# Patient Record
Sex: FEMALE | Race: BLACK OR AFRICAN AMERICAN | NOT HISPANIC OR LATINO | Employment: FULL TIME | URBAN - METROPOLITAN AREA
[De-identification: names, ages, dates, MRNs, and addresses within clinical notes are randomized per-mention and may not be internally consistent; named-entity substitution may affect disease eponyms.]

---

## 2020-06-08 ENCOUNTER — LAB VISIT (OUTPATIENT)
Dept: INTERNAL MEDICINE | Facility: CLINIC | Age: 25
End: 2020-06-08
Payer: COMMERCIAL

## 2020-06-08 DIAGNOSIS — R50.9 FEVER: ICD-10-CM

## 2020-06-08 DIAGNOSIS — R51.9 HEAD ACHE: ICD-10-CM

## 2020-06-08 PROCEDURE — U0003 INFECTIOUS AGENT DETECTION BY NUCLEIC ACID (DNA OR RNA); SEVERE ACUTE RESPIRATORY SYNDROME CORONAVIRUS 2 (SARS-COV-2) (CORONAVIRUS DISEASE [COVID-19]), AMPLIFIED PROBE TECHNIQUE, MAKING USE OF HIGH THROUGHPUT TECHNOLOGIES AS DESCRIBED BY CMS-2020-01-R: HCPCS

## 2020-06-09 ENCOUNTER — TELEPHONE (OUTPATIENT)
Dept: OBSTETRICS AND GYNECOLOGY | Facility: CLINIC | Age: 25
End: 2020-06-09

## 2020-06-09 LAB — SARS-COV-2 RNA RESP QL NAA+PROBE: DETECTED

## 2020-08-12 ENCOUNTER — LAB VISIT (OUTPATIENT)
Dept: LAB | Facility: HOSPITAL | Age: 25
End: 2020-08-12
Attending: NURSE PRACTITIONER
Payer: COMMERCIAL

## 2020-08-12 DIAGNOSIS — U07.1 COVID-19 VIRUS INFECTION: Primary | ICD-10-CM

## 2020-08-12 DIAGNOSIS — U07.1 COVID-19 VIRUS INFECTION: ICD-10-CM

## 2020-08-12 PROCEDURE — 36415 COLL VENOUS BLD VENIPUNCTURE: CPT

## 2020-08-12 PROCEDURE — 86769 SARS-COV-2 COVID-19 ANTIBODY: CPT

## 2020-08-13 ENCOUNTER — PATIENT MESSAGE (OUTPATIENT)
Dept: OBSTETRICS AND GYNECOLOGY | Facility: CLINIC | Age: 25
End: 2020-08-13

## 2020-08-13 LAB — SARS-COV-2 IGG SERPLBLD QL IA.RAPID: POSITIVE

## 2020-08-21 ENCOUNTER — LAB VISIT (OUTPATIENT)
Dept: LAB | Facility: HOSPITAL | Age: 25
End: 2020-08-21
Attending: NURSE PRACTITIONER
Payer: COMMERCIAL

## 2020-08-21 DIAGNOSIS — N92.6 IRREGULAR MENSES: Primary | ICD-10-CM

## 2020-08-21 DIAGNOSIS — N92.6 IRREGULAR MENSES: ICD-10-CM

## 2020-08-21 LAB — HCG INTACT+B SERPL-ACNC: <1.2 MIU/ML

## 2020-08-21 PROCEDURE — 84702 CHORIONIC GONADOTROPIN TEST: CPT

## 2020-08-21 PROCEDURE — 36415 COLL VENOUS BLD VENIPUNCTURE: CPT

## 2020-09-24 ENCOUNTER — PATIENT MESSAGE (OUTPATIENT)
Dept: DIABETES | Facility: CLINIC | Age: 25
End: 2020-09-24

## 2020-09-24 ENCOUNTER — PATIENT MESSAGE (OUTPATIENT)
Dept: ENDOCRINOLOGY | Facility: CLINIC | Age: 25
End: 2020-09-24

## 2020-09-25 ENCOUNTER — PATIENT MESSAGE (OUTPATIENT)
Dept: DIABETES | Facility: CLINIC | Age: 25
End: 2020-09-25

## 2020-09-29 ENCOUNTER — PATIENT MESSAGE (OUTPATIENT)
Dept: ENDOCRINOLOGY | Facility: CLINIC | Age: 25
End: 2020-09-29

## 2020-09-29 ENCOUNTER — OFFICE VISIT (OUTPATIENT)
Dept: OPHTHALMOLOGY | Facility: CLINIC | Age: 25
End: 2020-09-29
Payer: COMMERCIAL

## 2020-09-29 DIAGNOSIS — E10.9 TYPE 1 DIABETES MELLITUS WITHOUT COMPLICATION: Primary | ICD-10-CM

## 2020-09-29 DIAGNOSIS — H52.7 REFRACTIVE ERROR: ICD-10-CM

## 2020-09-29 PROCEDURE — 99999 PR PBB SHADOW E&M-EST. PATIENT-LVL III: CPT | Mod: PBBFAC,,, | Performed by: STUDENT IN AN ORGANIZED HEALTH CARE EDUCATION/TRAINING PROGRAM

## 2020-09-29 PROCEDURE — 92004 COMPRE OPH EXAM NEW PT 1/>: CPT | Mod: S$GLB,,, | Performed by: STUDENT IN AN ORGANIZED HEALTH CARE EDUCATION/TRAINING PROGRAM

## 2020-09-29 PROCEDURE — 99999 PR PBB SHADOW E&M-EST. PATIENT-LVL III: ICD-10-PCS | Mod: PBBFAC,,, | Performed by: STUDENT IN AN ORGANIZED HEALTH CARE EDUCATION/TRAINING PROGRAM

## 2020-09-29 PROCEDURE — 92015 PR REFRACTION: ICD-10-PCS | Mod: S$GLB,,, | Performed by: STUDENT IN AN ORGANIZED HEALTH CARE EDUCATION/TRAINING PROGRAM

## 2020-09-29 PROCEDURE — 92015 DETERMINE REFRACTIVE STATE: CPT | Mod: S$GLB,,, | Performed by: STUDENT IN AN ORGANIZED HEALTH CARE EDUCATION/TRAINING PROGRAM

## 2020-09-29 PROCEDURE — 92004 PR EYE EXAM, NEW PATIENT,COMPREHESV: ICD-10-PCS | Mod: S$GLB,,, | Performed by: STUDENT IN AN ORGANIZED HEALTH CARE EDUCATION/TRAINING PROGRAM

## 2020-09-29 RX ORDER — LANCETS 33 GAUGE
1 EACH MISCELLANEOUS
COMMUNITY
Start: 2018-04-10 | End: 2022-06-10 | Stop reason: ALTCHOICE

## 2020-09-29 RX ORDER — DESLORATADINE 5 MG/1
TABLET ORAL
COMMUNITY
End: 2023-09-15

## 2020-09-29 RX ORDER — FERROUS SULFATE 325(65) MG
TABLET ORAL
COMMUNITY
End: 2021-05-25

## 2020-09-29 RX ORDER — PEN NEEDLE, DIABETIC 30 GX3/16"
NEEDLE, DISPOSABLE MISCELLANEOUS
COMMUNITY
Start: 2018-05-15

## 2020-09-29 RX ORDER — ESCITALOPRAM OXALATE 10 MG/1
TABLET ORAL
COMMUNITY
End: 2021-05-25

## 2020-09-29 RX ORDER — INSULIN ASPART 100 [IU]/ML
INJECTION, SOLUTION INTRAVENOUS; SUBCUTANEOUS
COMMUNITY
Start: 2020-06-30 | End: 2020-11-25 | Stop reason: SDUPTHER

## 2020-09-29 RX ORDER — GLUCAGON 1 MG
1 VIAL (EA) INJECTION DAILY PRN
COMMUNITY
Start: 2017-08-01

## 2020-09-29 RX ORDER — URINE ACETONE TEST STRIPS
STRIP MISCELLANEOUS
COMMUNITY
Start: 2017-08-01

## 2020-09-29 RX ORDER — INSULIN GLARGINE 100 [IU]/ML
INJECTION, SOLUTION SUBCUTANEOUS
COMMUNITY
Start: 2020-06-25 | End: 2020-11-25 | Stop reason: SDUPTHER

## 2020-09-29 NOTE — PROGRESS NOTES
HPI     The patient is here for her annual eye exam. The patient states she has   had type 1 DB since age 13. The patients last A1C was 10.0.     NP- Referred by self [facility]    1. +DM type 1  + insulin  2. Refractive Error    Last edited by Mayela Sullivan on 9/29/2020  1:35 PM. (History)            Assessment /Plan     For exam results, see Encounter Report.    Type 1 diabetes mellitus without complication- Diabetes Mellitis without retinopathy  - Strict blood glucose control, discussed A1C control  - Annual DFE  - Follow with PCP      Refractive error- MR dispensed     Glaucoma Suspect- based on risk factors, enlarged C/D ratio, and AA race. Unknown FH. Low suspicion. Will obtain OCT RNFL    RTC in 1 year with dilation                   [General Appearance - Alert] : alert [General Appearance - In No Acute Distress] : in no acute distress [Sclera] : the sclera and conjunctiva were normal [Outer Ear] : the ears and nose were normal in appearance [Jugular Venous Distention Increased] : there was no jugular-venous distention [Auscultation Breath Sounds / Voice Sounds] : lungs were clear to auscultation bilaterally [Heart Sounds Pericardial Friction Rub] : no pericardial rub [Heart Sounds Gallop] : no gallops [Full Pulse] : the pedal pulses are present [Edema] : there was no peripheral edema [Abdomen Soft] : soft [Abdomen Tenderness] : non-tender [Cervical Lymph Nodes Enlarged Posterior Bilaterally] : posterior cervical [Cervical Lymph Nodes Enlarged Anterior Bilaterally] : anterior cervical [Supraclavicular Lymph Nodes Enlarged Bilaterally] : supraclavicular [Axillary Lymph Nodes Enlarged Bilaterally] : axillary [Femoral Lymph Nodes Enlarged Bilaterally] : femoral [Inguinal Lymph Nodes Enlarged Bilaterally] : inguinal [Involuntary Movements] : no involuntary movements were seen [___ (cm) Fistula] : [unfilled] (cm) fistula [Bruit] : a bruit was present [Thrill] : a thrill was present [] : no rash [No Focal Deficits] : no focal deficits [Impaired Insight] : insight and judgment were intact [Oriented To Time, Place, And Person] : oriented to person, place, and time [Affect] : the affect was normal [FreeTextEntry1] : left forearm AVF

## 2020-10-01 ENCOUNTER — OFFICE VISIT (OUTPATIENT)
Dept: ENDOCRINOLOGY | Facility: CLINIC | Age: 25
End: 2020-10-01
Payer: COMMERCIAL

## 2020-10-01 ENCOUNTER — LAB VISIT (OUTPATIENT)
Dept: LAB | Facility: HOSPITAL | Age: 25
End: 2020-10-01
Attending: INTERNAL MEDICINE
Payer: COMMERCIAL

## 2020-10-01 VITALS
BODY MASS INDEX: 42.56 KG/M2 | RESPIRATION RATE: 18 BRPM | WEIGHT: 249.31 LBS | HEIGHT: 64 IN | DIASTOLIC BLOOD PRESSURE: 90 MMHG | HEART RATE: 98 BPM | SYSTOLIC BLOOD PRESSURE: 129 MMHG

## 2020-10-01 DIAGNOSIS — E10.65 TYPE 1 DIABETES MELLITUS WITH HYPERGLYCEMIA: Primary | ICD-10-CM

## 2020-10-01 DIAGNOSIS — E10.65 TYPE 1 DIABETES MELLITUS WITH HYPERGLYCEMIA: ICD-10-CM

## 2020-10-01 DIAGNOSIS — E78.5 DYSLIPIDEMIA: ICD-10-CM

## 2020-10-01 DIAGNOSIS — R73.09 ELEVATED HEMOGLOBIN A1C: ICD-10-CM

## 2020-10-01 LAB
ALBUMIN SERPL BCP-MCNC: 3.5 G/DL (ref 3.5–5.2)
ALP SERPL-CCNC: 93 U/L (ref 55–135)
ALT SERPL W/O P-5'-P-CCNC: 12 U/L (ref 10–44)
ANION GAP SERPL CALC-SCNC: 8 MMOL/L (ref 8–16)
AST SERPL-CCNC: 18 U/L (ref 10–40)
BASOPHILS # BLD AUTO: 0.04 K/UL (ref 0–0.2)
BASOPHILS NFR BLD: 0.5 % (ref 0–1.9)
BILIRUB SERPL-MCNC: 0.5 MG/DL (ref 0.1–1)
BUN SERPL-MCNC: 5 MG/DL (ref 6–20)
CALCIUM SERPL-MCNC: 8.9 MG/DL (ref 8.7–10.5)
CHLORIDE SERPL-SCNC: 104 MMOL/L (ref 95–110)
CO2 SERPL-SCNC: 27 MMOL/L (ref 23–29)
CREAT SERPL-MCNC: 0.7 MG/DL (ref 0.5–1.4)
DIFFERENTIAL METHOD: ABNORMAL
EOSINOPHIL # BLD AUTO: 0.1 K/UL (ref 0–0.5)
EOSINOPHIL NFR BLD: 1.1 % (ref 0–8)
ERYTHROCYTE [DISTWIDTH] IN BLOOD BY AUTOMATED COUNT: 14.5 % (ref 11.5–14.5)
EST. GFR  (AFRICAN AMERICAN): >60 ML/MIN/1.73 M^2
EST. GFR  (NON AFRICAN AMERICAN): >60 ML/MIN/1.73 M^2
GLUCOSE SERPL-MCNC: 143 MG/DL (ref 70–110)
HCT VFR BLD AUTO: 43.8 % (ref 37–48.5)
HGB BLD-MCNC: 12.7 G/DL (ref 12–16)
IMM GRANULOCYTES # BLD AUTO: 0.02 K/UL (ref 0–0.04)
IMM GRANULOCYTES NFR BLD AUTO: 0.2 % (ref 0–0.5)
LYMPHOCYTES # BLD AUTO: 4 K/UL (ref 1–4.8)
LYMPHOCYTES NFR BLD: 49.6 % (ref 18–48)
MCH RBC QN AUTO: 22.3 PG (ref 27–31)
MCHC RBC AUTO-ENTMCNC: 29 G/DL (ref 32–36)
MCV RBC AUTO: 77 FL (ref 82–98)
MONOCYTES # BLD AUTO: 0.5 K/UL (ref 0.3–1)
MONOCYTES NFR BLD: 5.8 % (ref 4–15)
NEUTROPHILS # BLD AUTO: 3.4 K/UL (ref 1.8–7.7)
NEUTROPHILS NFR BLD: 42.8 % (ref 38–73)
NRBC BLD-RTO: 0 /100 WBC
PLATELET # BLD AUTO: 250 K/UL (ref 150–350)
PMV BLD AUTO: 11.3 FL (ref 9.2–12.9)
POTASSIUM SERPL-SCNC: 4.1 MMOL/L (ref 3.5–5.1)
PROT SERPL-MCNC: 7.7 G/DL (ref 6–8.4)
RBC # BLD AUTO: 5.7 M/UL (ref 4–5.4)
SODIUM SERPL-SCNC: 139 MMOL/L (ref 136–145)
TSH SERPL DL<=0.005 MIU/L-ACNC: 1.73 UIU/ML (ref 0.4–4)
WBC # BLD AUTO: 8.04 K/UL (ref 3.9–12.7)

## 2020-10-01 PROCEDURE — 95250 PR GLUCOSE MONITORING,72 HRS,SUB-Q SENSOR: ICD-10-PCS | Mod: S$GLB,,, | Performed by: INTERNAL MEDICINE

## 2020-10-01 PROCEDURE — 80053 COMPREHEN METABOLIC PANEL: CPT

## 2020-10-01 PROCEDURE — 99204 OFFICE O/P NEW MOD 45 MIN: CPT | Mod: S$GLB,,, | Performed by: INTERNAL MEDICINE

## 2020-10-01 PROCEDURE — 95250 CONT GLUC MNTR PHYS/QHP EQP: CPT | Mod: S$GLB,,, | Performed by: INTERNAL MEDICINE

## 2020-10-01 PROCEDURE — 99999 PR PBB SHADOW E&M-EST. PATIENT-LVL III: CPT | Mod: PBBFAC,,, | Performed by: INTERNAL MEDICINE

## 2020-10-01 PROCEDURE — 36415 COLL VENOUS BLD VENIPUNCTURE: CPT

## 2020-10-01 PROCEDURE — 84443 ASSAY THYROID STIM HORMONE: CPT

## 2020-10-01 PROCEDURE — 99999 PR PBB SHADOW E&M-EST. PATIENT-LVL III: ICD-10-PCS | Mod: PBBFAC,,, | Performed by: INTERNAL MEDICINE

## 2020-10-01 PROCEDURE — 83036 HEMOGLOBIN GLYCOSYLATED A1C: CPT

## 2020-10-01 PROCEDURE — 85025 COMPLETE CBC W/AUTO DIFF WBC: CPT

## 2020-10-01 PROCEDURE — 99204 PR OFFICE/OUTPT VISIT, NEW, LEVL IV, 45-59 MIN: ICD-10-PCS | Mod: S$GLB,,, | Performed by: INTERNAL MEDICINE

## 2020-10-01 NOTE — PROGRESS NOTES
Patient is here in clinic today for placement for continuous glucose monitoring sensor (cgms). Site is the back of the L upper arm. Site was cleaned and sensor was placed and started. Patient didn't ask any further questions regarding sensor placement. Patient was instructed to continue all daily activities such as shower and also check blood glucose levels as instructed. Patient was also informed to avoid any imaging procedures and acetaminophen during her procedure. Patient voiced understanding of all instructions given. Patient is scheduled to come back in one week for removal of sensor

## 2020-10-01 NOTE — PROGRESS NOTES
PCP: ARCENIO Hughes     Patient ID: Chey Chavira is a 24 y.o. female.    Chief Complaint:   Establish Care   DM      HPI    Patient is here for management of her diabetes.  She was managed by Dr. Horta.  She was diagnosed with diabetes more than 10 years ago.  She has type 1 diabetes.  On Lantus 36 units every morning  On NovoLog sliding scale as follows  1 unit for each 5 g of carbs plus the following equation  Insulin units equal blood sugar -120 divided by 20  For correction  History of using an insulin pump 5-6 years ago  Patient has Medtronic insulin pump with a sensor at home currently  CBGs 100-300  POCT GLUCOSE 206  Hypoglycemia occurs sometimes at 2-3 a.m.  Eye exam was done this year  No pain or numbness in feet  Diabetes ketoacidosis occurred in March 2019 and in 2018  Not on blood pressure medication  Not on cholesterol medication    Patient is .  She has 1 daughter      NO FAMILY HISTORY OF TYPE 1 DIABETES    Pt is an [employer]        Past Medical History:   Diagnosis Date    Anxiety     Diabetes mellitus     Diabetes mellitus type I     Hypercholesteremia        Past Surgical History:   Procedure Laterality Date    TONSILLECTOMY         Social History     Socioeconomic History    Marital status:      Spouse name: Not on file    Number of children: Not on file    Years of education: Not on file    Highest education level: Not on file   Occupational History    Not on file   Social Needs    Financial resource strain: Not on file    Food insecurity     Worry: Not on file     Inability: Not on file    Transportation needs     Medical: Not on file     Non-medical: Not on file   Tobacco Use    Smoking status: Never Smoker    Smokeless tobacco: Never Used   Substance and Sexual Activity    Alcohol use: Never     Frequency: Never    Drug use: Never    Sexual activity: Yes     Partners: Male     Birth control/protection: None   Lifestyle    Physical activity     Days  per week: Not on file     Minutes per session: Not on file    Stress: Not on file   Relationships    Social connections     Talks on phone: Not on file     Gets together: Not on file     Attends Tenriism service: Not on file     Active member of club or organization: Not on file     Attends meetings of clubs or organizations: Not on file     Relationship status: Not on file   Other Topics Concern    Not on file   Social History Narrative    Not on file       ROS:   Included in HPI  + Diabetes   ROS otherwise neg except for what is mentioned in the PMH, PSH and HPI    PE:  Vitals:    10/01/20 1007   BP: (!) 129/90   Pulse: 98   Resp: 18     Alert and oriented  No acute distress  + Obesity  Body mass index is 42.8 kg/m².  + Acanthosis  No acne  No Proptosis or conjunctivitis  No rash on tongue, + teeth  Nose nl  No goitre by inspection  Thyroid gland is palpable  No cervical lymphadenopathy  Heart reg, no gallop  Lungs cta, no wheezing  Abd soft, no tnd  No edema in lower legs  No rash  No bruises  Speech normal  Behavior normal  No tremor      Wt Readings from Last 1 Encounters:   10/01/20 1007 113.1 kg (249 lb 5.4 oz)         Lab Reviewed from 2019    A/P  Type 1 diabetes mellitus with hyperglycemia  Diabetes diagnosed more than 10 years ago  On Lantus 36 units every morning and on  Humalog as follows  1 unit for each 5 g of carbs plus 1 unit for each 20 mg above 120  History of treatment with Animus insulin pump about 6 years ago  Patient has her Medtronic insulin pump and the sensor at home  A1c was above 11 in 2019  History of diabetes ketoacidosis in March 2019 and DKA also in 2018  To avoid statin to avoid ACE-inhibitor because of the plan for pregnancy  POCT glucose 206, 4 hr postprandial  The plan is to reduce the long acting insulin and to increase the dose of Humalog   1 unit with each meal  -     Comprehensive metabolic panel; Future; Expected date: 10/01/2020  -     Hemoglobin A1C; Future; Expected  date: 10/01/2020  -     TSH; Future; Expected date: 10/01/2020  -     CBC auto differential; Future; Expected date: 10/01/2020    Elevated hemoglobin A1c    Dyslipidemia       Patient Instructions   Lantus 33 units    Humalog  Same equation you do  Plus one unit.     A glucose sensor will be applied today for 7 days for further evaluation of blood sugar control.  Follow-up will be needed next week.    Patient understands and agrees on the plan.

## 2020-10-02 LAB
ESTIMATED AVG GLUCOSE: 275 MG/DL (ref 68–131)
HBA1C MFR BLD HPLC: 11.2 % (ref 4–5.6)

## 2020-10-08 ENCOUNTER — CLINICAL SUPPORT (OUTPATIENT)
Dept: ENDOCRINOLOGY | Facility: CLINIC | Age: 25
End: 2020-10-08
Payer: COMMERCIAL

## 2020-10-08 ENCOUNTER — OFFICE VISIT (OUTPATIENT)
Dept: INTERNAL MEDICINE | Facility: CLINIC | Age: 25
End: 2020-10-08
Payer: COMMERCIAL

## 2020-10-08 ENCOUNTER — TELEPHONE (OUTPATIENT)
Dept: ADMINISTRATIVE | Facility: HOSPITAL | Age: 25
End: 2020-10-08

## 2020-10-08 VITALS
WEIGHT: 244.69 LBS | HEIGHT: 66 IN | SYSTOLIC BLOOD PRESSURE: 116 MMHG | BODY MASS INDEX: 39.32 KG/M2 | TEMPERATURE: 98 F | HEART RATE: 80 BPM | DIASTOLIC BLOOD PRESSURE: 68 MMHG

## 2020-10-08 DIAGNOSIS — R53.83 FATIGUE, UNSPECIFIED TYPE: ICD-10-CM

## 2020-10-08 DIAGNOSIS — E10.65 TYPE 1 DIABETES MELLITUS WITH HYPERGLYCEMIA: Primary | ICD-10-CM

## 2020-10-08 DIAGNOSIS — Z00.00 ROUTINE GENERAL MEDICAL EXAMINATION AT A HEALTH CARE FACILITY: ICD-10-CM

## 2020-10-08 PROCEDURE — 90471 FLU VACCINE (QUAD) GREATER THAN OR EQUAL TO 3YO PRESERVATIVE FREE IM: ICD-10-PCS | Mod: S$GLB,,, | Performed by: PHYSICIAN ASSISTANT

## 2020-10-08 PROCEDURE — 90686 FLU VACCINE (QUAD) GREATER THAN OR EQUAL TO 3YO PRESERVATIVE FREE IM: ICD-10-PCS | Mod: S$GLB,,, | Performed by: PHYSICIAN ASSISTANT

## 2020-10-08 PROCEDURE — 90471 IMMUNIZATION ADMIN: CPT | Mod: S$GLB,,, | Performed by: PHYSICIAN ASSISTANT

## 2020-10-08 PROCEDURE — 99385 PR PREVENTIVE VISIT,NEW,18-39: ICD-10-PCS | Mod: 25,S$GLB,, | Performed by: PHYSICIAN ASSISTANT

## 2020-10-08 PROCEDURE — 90686 IIV4 VACC NO PRSV 0.5 ML IM: CPT | Mod: S$GLB,,, | Performed by: PHYSICIAN ASSISTANT

## 2020-10-08 PROCEDURE — 82043 UR ALBUMIN QUANTITATIVE: CPT

## 2020-10-08 PROCEDURE — 99385 PREV VISIT NEW AGE 18-39: CPT | Mod: 25,S$GLB,, | Performed by: PHYSICIAN ASSISTANT

## 2020-10-08 PROCEDURE — 99999 PR PBB SHADOW E&M-EST. PATIENT-LVL IV: ICD-10-PCS | Mod: PBBFAC,,, | Performed by: PHYSICIAN ASSISTANT

## 2020-10-08 PROCEDURE — 99999 PR PBB SHADOW E&M-EST. PATIENT-LVL IV: CPT | Mod: PBBFAC,,, | Performed by: PHYSICIAN ASSISTANT

## 2020-10-08 RX ORDER — IRON,CARBONYL/ASCORBIC ACID 100-250 MG
1 TABLET ORAL DAILY
Qty: 90 TABLET | Refills: 0 | Status: SHIPPED | OUTPATIENT
Start: 2020-10-08 | End: 2021-01-06

## 2020-10-08 NOTE — PROGRESS NOTES
CGMS removed from pt after data downloaded. Site cleansed and checked for irritation. No irritation noted to left upper arm. Pt without complaints   Loaded into media

## 2020-10-08 NOTE — PROGRESS NOTES
Subjective:       Patient ID: Chey Chavira is a 24 y.o. female.    Chief Complaint: Establish Care    HPI  Patient comes in today as a new patient to clinic   She was previously with [facility] providers   She is an [employer] employee     She is a type 1 DM, uncontrolled. recently seen by Dr. Matthews, needing to get with a diabetic team member, states has a pump but currently not using it  She states she has making dietary change with her and they cooking and choosing healthier diet     She has some issue with some significant sleepiness in the afternoons as well as waking up with sweats.  Since her sugars pretty controlled not sure if that has something to do with it.    She states has history of YOSEF     Health Maintenance Due   Topic Date Due    Hepatitis C Screening  1997    Lipid Panel  1997    Foot Exam  12/18/2007    Urine Microalbumin  12/18/2007    HIV Screening  12/18/2012    TETANUS VACCINE  12/18/2015    Influenza Vaccine (1) 08/01/2020       Past Medical History:   Diagnosis Date    Anxiety     Diabetes mellitus     Diabetes mellitus type I     Hypercholesteremia        Current Outpatient Medications   Medication Sig Dispense Refill    acetone, urine, test (KETOSTIX) Strp Check urine ketones when BG > 250      blood sugar diagnostic (ONETOUCH ULTRA BLUE TEST STRIP MISC) Use to check blood sugars 10 times daily      desloratadine (CLARINEX) 5 mg tablet Clarinex 5 mg tablet   Take 1 tablet every day by oral route for 30 days.      escitalopram oxalate (LEXAPRO) 10 MG tablet Lexapro 10 mg tablet   Take 1 tablet every day by oral route in the morning.      ferrous sulfate (FEOSOL) 325 mg (65 mg iron) Tab tablet ferrous sulfate 325 mg (65 mg iron) tablet   Take 1 tablet every day by oral route.      glucagon, human recombinant, (GLUCAGON EMERGENCY KIT, HUMAN,) 1 mg SolR Inject 1 mg into the muscle daily as needed.      insulin (LANTUS SOLOSTAR U-100 INSULIN) glargine 100 units/mL (3mL) SubQ  "pen INJECT 36 UNITS UNDER THE SKIN EVERY MORNING      insulin aspart U-100 (NOVOLOG) 100 unit/mL (3 mL) InPn pen I:C ratio 1:5 with correction factor of 20 and target of 120; max of 70 units per day      lancets (ONETOUCH DELICA LANCETS) 33 gauge Misc Inject 1 each into the skin.      pen needle, diabetic 31 gauge x 3/16" Ndle Use as directed to inject insulin      iron-vitamin C 100-250 mg, ICAR-C, (ICAR-C) 100-250 mg Tab Take 1 tablet by mouth Daily. 90 tablet 0     No current facility-administered medications for this visit.        Review of Systems   Constitutional: Positive for activity change, diaphoresis (at night ) and fatigue. Negative for fever and unexpected weight change.   HENT: Negative for sore throat and trouble swallowing.    Respiratory: Negative for cough and shortness of breath.    Cardiovascular: Negative for chest pain.   Gastrointestinal: Negative for abdominal pain.   Endocrine: Positive for heat intolerance.   Hematological: Negative for adenopathy. Does not bruise/bleed easily.   All other systems reviewed and are negative.      Objective:   /68 (BP Location: Right arm, Patient Position: Sitting, BP Method: X-Large (Manual))   Pulse 80   Temp 98 °F (36.7 °C) (Tympanic)   Ht 5' 5.5" (1.664 m)   Wt 111 kg (244 lb 11.4 oz)   LMP 09/25/2020   BMI 40.10 kg/m²      Physical Exam  Constitutional:       Appearance: Normal appearance. She is obese.   HENT:      Head: Normocephalic and atraumatic.      Right Ear: Tympanic membrane and ear canal normal.      Left Ear: Tympanic membrane and ear canal normal.      Nose: Nose normal.      Mouth/Throat:      Mouth: Mucous membranes are moist.   Eyes:      Pupils: Pupils are equal, round, and reactive to light.   Neck:      Musculoskeletal: Normal range of motion and neck supple.   Cardiovascular:      Rate and Rhythm: Normal rate and regular rhythm.      Pulses: Normal pulses.      Heart sounds: Normal heart sounds.   Pulmonary:      " Breath sounds: Normal breath sounds.   Skin:     General: Skin is warm.      Capillary Refill: Capillary refill takes less than 2 seconds.   Neurological:      General: No focal deficit present.      Mental Status: She is alert.   Psychiatric:         Mood and Affect: Mood normal.         Thought Content: Thought content normal.         Judgment: Judgment normal.           Lab Results   Component Value Date    WBC 8.04 10/01/2020    HGB 12.7 10/01/2020    HCT 43.8 10/01/2020     10/01/2020    ALT 12 10/01/2020    AST 18 10/01/2020     10/01/2020    K 4.1 10/01/2020     10/01/2020    CREATININE 0.7 10/01/2020    BUN 5 (L) 10/01/2020    CO2 27 10/01/2020    TSH 1.733 10/01/2020    HGBA1C 11.2 (H) 10/01/2020       Assessment:       1. Type 1 diabetes mellitus with hyperglycemia    2. Fatigue, unspecified type    3. Routine general medical examination at a health care facility        Plan:   Type 1 diabetes mellitus with hyperglycemia  -     Iron and TIBC; Future; Expected date: 10/08/2020  -     Vitamin D; Future; Expected date: 10/08/2020  -     Lipid Panel; Future; Expected date: 10/08/2020  -     MICROALBUMIN / CREATININE RATIO URINE  -     Ambulatory referral/consult to Diabetes Education; Future; Expected date: 10/15/2020    Fatigue, unspecified type  -     Iron and TIBC; Future; Expected date: 10/08/2020  -     Vitamin D; Future; Expected date: 10/08/2020  -     Lipid Panel; Future; Expected date: 10/08/2020    Routine general medical examination at a health care facility  -     HIV 1/2 Ag/Ab (4th Gen); Future; Expected date: 10/08/2020  -     Hepatitis C Antibody; Future; Expected date: 10/08/2020    Other orders  -     iron-vitamin C 100-250 mg, ICAR-C, (ICAR-C) 100-250 mg Tab; Take 1 tablet by mouth Daily.  Dispense: 90 tablet; Refill: 0  -     Influenza - Quadrivalent (PF)      Start iron + C   Thinking sugars have a lot to do with her waking up sweating and fatgue    Will set her up with  DM team in hopes we get her back on a pump       Update labs

## 2020-10-08 NOTE — TELEPHONE ENCOUNTER
.Patient refused to schedule  Labs  during checkout.   Pt will to labs later she will call when she is ready

## 2020-10-09 LAB
ALBUMIN/CREAT UR: 385.5 UG/MG (ref 0–30)
CREAT UR-MCNC: 76 MG/DL (ref 15–325)
MICROALBUMIN UR DL<=1MG/L-MCNC: 293 UG/ML

## 2020-10-12 ENCOUNTER — LAB VISIT (OUTPATIENT)
Dept: LAB | Facility: HOSPITAL | Age: 25
End: 2020-10-12
Attending: PHYSICIAN ASSISTANT
Payer: COMMERCIAL

## 2020-10-12 DIAGNOSIS — E10.65 TYPE 1 DIABETES MELLITUS WITH HYPERGLYCEMIA: ICD-10-CM

## 2020-10-12 DIAGNOSIS — Z00.00 ROUTINE GENERAL MEDICAL EXAMINATION AT A HEALTH CARE FACILITY: ICD-10-CM

## 2020-10-12 DIAGNOSIS — R53.83 FATIGUE, UNSPECIFIED TYPE: ICD-10-CM

## 2020-10-12 LAB
25(OH)D3+25(OH)D2 SERPL-MCNC: 16 NG/ML (ref 30–96)
CHOLEST SERPL-MCNC: 210 MG/DL (ref 120–199)
CHOLEST/HDLC SERPL: 3.8 {RATIO} (ref 2–5)
HDLC SERPL-MCNC: 55 MG/DL (ref 40–75)
HDLC SERPL: 26.2 % (ref 20–50)
IRON SERPL-MCNC: 74 UG/DL (ref 30–160)
LDLC SERPL CALC-MCNC: 134.4 MG/DL (ref 63–159)
NONHDLC SERPL-MCNC: 155 MG/DL
SATURATED IRON: 20 % (ref 20–50)
TOTAL IRON BINDING CAPACITY: 369 UG/DL (ref 250–450)
TRANSFERRIN SERPL-MCNC: 249 MG/DL (ref 200–375)
TRIGL SERPL-MCNC: 103 MG/DL (ref 30–150)

## 2020-10-12 PROCEDURE — 36415 COLL VENOUS BLD VENIPUNCTURE: CPT

## 2020-10-12 PROCEDURE — 80061 LIPID PANEL: CPT

## 2020-10-12 PROCEDURE — 83540 ASSAY OF IRON: CPT

## 2020-10-12 PROCEDURE — 86703 HIV-1/HIV-2 1 RESULT ANTBDY: CPT

## 2020-10-12 PROCEDURE — 82306 VITAMIN D 25 HYDROXY: CPT

## 2020-10-12 PROCEDURE — 86803 HEPATITIS C AB TEST: CPT

## 2020-10-13 LAB
HCV AB SERPL QL IA: NEGATIVE
HIV 1+2 AB+HIV1 P24 AG SERPL QL IA: NEGATIVE

## 2020-11-04 ENCOUNTER — PATIENT MESSAGE (OUTPATIENT)
Dept: DIABETES | Facility: CLINIC | Age: 25
End: 2020-11-04

## 2020-11-18 ENCOUNTER — PATIENT MESSAGE (OUTPATIENT)
Dept: DIABETES | Facility: CLINIC | Age: 25
End: 2020-11-18

## 2020-11-25 ENCOUNTER — PATIENT MESSAGE (OUTPATIENT)
Dept: ENDOCRINOLOGY | Facility: CLINIC | Age: 25
End: 2020-11-25

## 2020-11-26 RX ORDER — INSULIN ASPART 100 [IU]/ML
INJECTION, SOLUTION INTRAVENOUS; SUBCUTANEOUS
Qty: 15 ML | Refills: 4 | Status: SHIPPED | OUTPATIENT
Start: 2020-11-26 | End: 2021-05-25

## 2020-11-26 RX ORDER — INSULIN GLARGINE 100 [IU]/ML
INJECTION, SOLUTION SUBCUTANEOUS
Qty: 15 ML | Refills: 5 | Status: SHIPPED | OUTPATIENT
Start: 2020-11-26 | End: 2021-05-25

## 2020-12-15 ENCOUNTER — OFFICE VISIT (OUTPATIENT)
Dept: DIABETES | Facility: CLINIC | Age: 25
End: 2020-12-15
Payer: COMMERCIAL

## 2020-12-15 DIAGNOSIS — E10.65 UNCONTROLLED TYPE 1 DIABETES MELLITUS WITH HYPERGLYCEMIA: Primary | ICD-10-CM

## 2020-12-15 PROCEDURE — 99214 PR OFFICE/OUTPT VISIT, EST, LEVL IV, 30-39 MIN: ICD-10-PCS | Mod: 95,,, | Performed by: PHYSICIAN ASSISTANT

## 2020-12-15 PROCEDURE — 99214 OFFICE O/P EST MOD 30 MIN: CPT | Mod: 95,,, | Performed by: PHYSICIAN ASSISTANT

## 2020-12-15 RX ORDER — GLUCAGON 3 MG/1
1 POWDER NASAL DAILY PRN
Qty: 2 EACH | Refills: 1 | Status: SHIPPED | OUTPATIENT
Start: 2020-12-15

## 2020-12-15 NOTE — PATIENT INSTRUCTIONS
Baqsimi Glucagon Kit - An intranasal Glucagon kit called Baqsimi has been sent to your pharmacy. I am going to link some instructions for use from the website. It is important that you review these instructions so that you can teach your family members, friends, coworkers, or others who may need to administer the medication in an emergency. Remember that emergency Glucagon kit (intranasal or injectable) are to be used in an emergency situation where you as a patient have lost consciousness secondary to low blood sugar, and it is no longer safe for you to drink or eat anything to bring your blood sugar up. If you are able to eat or drink safely, you should continue to do so to treat low blood sugar and save the Baqsimi glucagon kit for emergency situations. If unable to ingest safely, someone would then administer the medication while calling 911 as you may require additional treatment. It is important to review this with your loved ones and let them know where you keep your emergency Baqsimi glucagon kit. Please review the link below and let me know if you have any questions or concerns:   [email or web address]    Baqsimi Savings Card - be sure to present this to the pharmacy in order to get 2 kits for $25  [email or web address]      Ketone Strips: A prescription for Ketone strips has been sent to your pharmacy. Some insurance companies cover ketone strips while other do not. If your insurance does not cover ketone strips, I still recommend that you buy some over the counter to keep for screening for Diabetic Ketoacidosis. Patients with Diabetes are at risk developing a condition called diabetic ketoacidosis (DKA). This is a condition where ketones or acid builds up in your blood and can be LIFE THREATENING. Diabetic Ketoacidosis is usually associated with high blood sugars greater than 250 mg/dL, but can also develop in some patients at normal blood sugar levels.  If you  notice abdominal pain, diarrhea, nausea, vomiting, lethargy, difficulty breathing, confusion, and unusual fatigue or sleepiness, please check your urine ketones using the ketone strips I gave you. I also recommend checking urine ketones if you are sick, having unexplained or not easily treated high blood sugars, or if you have an insulin pump failure. If your urine ketones are positive, please seek medical care immediately by going to the nearest Emergency Department.   Potential diabetic ketoacidosis (DKA) triggering factors identified in some cases included acute illness (e.g., urinary tract infection, urosepsis, gastroenteritis, influenza, or trauma), reduced caloric or fluid intake, and reduced insulin dose.

## 2020-12-15 NOTE — PROGRESS NOTES
PCP: ARCENIO Hughes    Subjective:     Chief Complaint: Diabetes Consult    TELEMEDICINE VISIT:     The patient location is: Home  The chief complaint leading to consultation is: Diabetes Follow up  Visit type: Virtual visit with synchronous audio and video  Total time spent with patient: 30  Each patient to whom he or she provides medical services by telemedicine is:  (1) informed of the relationship between the physician and patient and the respective role of any other health care provider with respect to management of the patient; and (2) notified that he or she may decline to receive medical services by telemedicine and may withdraw from such care at any time.    Notes: N/A      HISTORY OF PRESENT ILLNESS: 24 y.o.   female presenting for diabetes consult.   Patient previously managed by Dr. Matthews of [facility].   Patient has had Type I diabetes since age 13.  Pertinent to decision making is the following comorbidities: Obesity by BMI  Patient has the following Diabetes complications: without complications  She  is to be enrolled in diabetes education classes.     Patient's most recent A1c of 11.2% was completed 2 months ago.   Patient states since Her last A1c Her blood glucose levels have been both high and low throughout the day .   Patient monitors blood glucose 4 times per day with meter : Fasting, Before Lunch, Before Dinner and Before Bed.   Patient blood glucose monitoring device will not be uploaded into Media Section today secondary to Telemedicine visit.   Per patient recall, fasting blood sugars are ranging from 130 - 190, before lunch 60 - 130, before dinner 130 - 200, and before bed 100 - 200.   Patient endorses the following diabetes related symptoms: None.  Patient is due today for the following diabetes-related health maintenance standards: Foot Exam .   She denies recent hospital admissions or emergency room visits. Patient has history of DKA in March 2019 and 2018.    She voices having hypoglycemia   Patient's concerns today include glycemic control. Of note, patient previously used Adamant pump and has Medtronic Pump.  Patient medication regimen is as below.     CURRENT DM MEDICATIONS:    Lantus 33 units daily   Novolog IC 5 ISF 20     Other Considerations for Type I on MDI:   Current Glucagon Kit: no  Current Ketone Strips: yes    Labs Reviewed.       No results found for: CPEPTIDE  No results found for: GLUTAMICACID       //   , There is no height or weight on file to calculate BMI.  Her blood sugar in clinic today is:    No components found for: POCGLUC      Review of Systems   Constitutional: Negative for activity change, appetite change, chills and fever.   HENT: Negative for dental problem, mouth sores, nosebleeds, sore throat and trouble swallowing.    Eyes: Negative for pain and discharge.   Respiratory: Negative for shortness of breath, wheezing and stridor.    Cardiovascular: Negative for chest pain, palpitations and leg swelling.   Gastrointestinal: Negative for abdominal pain, diarrhea, nausea and vomiting.   Endocrine: Negative for polydipsia, polyphagia and polyuria.   Genitourinary: Negative for dysuria, frequency and urgency.   Musculoskeletal: Negative for joint swelling and myalgias.   Skin: Negative for rash and wound.   Neurological: Negative for dizziness, syncope, weakness and headaches.   Psychiatric/Behavioral: Negative for behavioral problems and dysphoric mood.         Diabetes Management Status  Statin: Not taking  ACE/ARB: Not taking    Screening or Prevention Patient's value Goal Complete/Controlled?   HgA1C Testing and Control   Lab Results   Component Value Date    HGBA1C 11.2 (H) 10/01/2020      Annually/Less than 8% No   Lipid profile : 10/12/2020 Annually Yes   LDL control Lab Results   Component Value Date    LDLCALC 134.4 10/12/2020    Annually/Less than 100 mg/dl  No   Nephropathy screening Lab Results   Component Value Date     MICALBCREAT 385.5 (H) 10/08/2020     No results found for: PROTEINUA Annually Yes   Blood pressure BP Readings from Last 1 Encounters:   10/08/20 116/68    Less than 140/90 Yes   Dilated retinal exam : 09/29/2020 Annually Yes    Foot exam   Most Recent Foot Exam Date: Not Found Annually No     Social History     Socioeconomic History    Marital status:      Spouse name: Not on file    Number of children: Not on file    Years of education: Not on file    Highest education level: Not on file   Occupational History    Not on file   Social Needs    Financial resource strain: Not on file    Food insecurity     Worry: Not on file     Inability: Not on file    Transportation needs     Medical: Not on file     Non-medical: Not on file   Tobacco Use    Smoking status: Never Smoker    Smokeless tobacco: Never Used   Substance and Sexual Activity    Alcohol use: Never     Frequency: Never    Drug use: Never    Sexual activity: Yes     Partners: Male     Birth control/protection: None   Lifestyle    Physical activity     Days per week: Not on file     Minutes per session: Not on file    Stress: Not on file   Relationships    Social connections     Talks on phone: Not on file     Gets together: Not on file     Attends Congregation service: Not on file     Active member of club or organization: Not on file     Attends meetings of clubs or organizations: Not on file     Relationship status: Not on file   Other Topics Concern    Not on file   Social History Narrative    Not on file     Past Medical History:   Diagnosis Date    Anxiety     Diabetes mellitus     Diabetes mellitus type I     Hypercholesteremia        Objective:      Physical Exam  Constitutional:       General: She is not in acute distress.     Appearance: She is well-developed. She is not diaphoretic.   HENT:      Head: Normocephalic and atraumatic.      Right Ear: External ear normal.      Left Ear: External ear normal.      Nose: Nose  normal.   Eyes:      General:         Right eye: No discharge.         Left eye: No discharge.   Neck:      Musculoskeletal: Normal range of motion.   Pulmonary:      Effort: Pulmonary effort is normal. No respiratory distress.      Breath sounds: No stridor.   Musculoskeletal: Normal range of motion.   Skin:     Coloration: Skin is not pale.   Neurological:      Mental Status: She is alert and oriented to person, place, and time.      Motor: No abnormal muscle tone.      Coordination: Coordination normal.   Psychiatric:         Behavior: Behavior normal.         Thought Content: Thought content normal.         Judgment: Judgment normal.         Physical Exam limited secondary to Telemedicine visit    Assessment / Plan:     Uncontrolled type 1 diabetes mellitus with hyperglycemia  -     glucagon (BAQSIMI) 3 mg/actuation Spry; 1 spray by Nasal route daily as needed (hypoglycemia).  Dispense: 2 each; Refill: 1    BMI 40.0-44.9, adult      Additional Plan Details:    - POCT Glucose  - Encouraged continuation of lifestyle changes including regular exercise and limiting carbohydrates to 30-45 grams per meal threes times daily and 15 grams per snack with a limit of two daily.   - Encouraged continued monitoring of blood glucose with maintenance of 4 times daily at Fasting, Before Lunch, Before Dinner and Before Bed. Will Rx Dexcom. Sample sensor to be picked up.   - Current DM Medication Regimen: Change Lantus 30 units daily. Continue Novolog IC 5 ISF 20 .   - Health Maintenance standards addressed today: Foot Exam - deferred by patient today because Telemedicine visit.   - Baqsimi and ketone strips Rx  - Nursing Visit: Patient is age 79 or younger with an A1c of 7.5 or greater and will not need nursing visit at this time .   - Follow up with me in 8 weeks with A1c prior.       Blakeney McKnight, PA-C [facility]    A total of 30 minutes was spent in face to face time, of which over 50 % was spent  in counseling patient on disease process, complications, treatment, and side effects of medications.

## 2020-12-15 NOTE — LETTER
December 16, 2020      Brina Matthews MD  [address] [facility]  [place] [address]           [facility]  [address] [facility]  [place] [address]  Phone: [phone]  Fax: [phone]          Patient: Chey Chavira   MR Number: [id number]   YOB: 1995   Date of Visit: 12/15/2020       Dear Dr. Brina Matthews:    Thank you for referring Chey Chavira to me for evaluation. Attached you will find relevant portions of my assessment and plan of care.    If you have questions, please do not hesitate to call me. I look forward to following Chey Chavira along with you.    Sincerely,    An Barahona PA-C    Enclosure  CC:  No Recipients    If you would like to receive this communication electronically, please contact [email or web address] or [phone] to request more information on [employer] access.    For providers and/or their staff who would like to refer a patient to [facility], please contact us through our one-stop-shop provider referral line, [facility] [place], at [phone].    If you feel you have received this communication in error or would no longer like to receive these types of communications, please e-mail [email or web address]

## 2020-12-16 ENCOUNTER — PATIENT MESSAGE (OUTPATIENT)
Dept: DIABETES | Facility: CLINIC | Age: 25
End: 2020-12-16

## 2021-01-04 ENCOUNTER — PATIENT MESSAGE (OUTPATIENT)
Dept: DIABETES | Facility: CLINIC | Age: 26
End: 2021-01-04

## 2021-01-04 ENCOUNTER — CLINICAL SUPPORT (OUTPATIENT)
Dept: DIABETES | Facility: CLINIC | Age: 26
End: 2021-01-04
Payer: COMMERCIAL

## 2021-01-04 DIAGNOSIS — E10.65 UNCONTROLLED TYPE 1 DIABETES MELLITUS WITH HYPERGLYCEMIA: Primary | ICD-10-CM

## 2021-01-04 PROCEDURE — 99499 NO LOS: ICD-10-PCS | Mod: S$GLB,,, | Performed by: PEDIATRICS

## 2021-01-04 PROCEDURE — 95251 CONT GLUC MNTR ANALYSIS I&R: CPT | Mod: S$GLB,,, | Performed by: PHYSICIAN ASSISTANT

## 2021-01-04 PROCEDURE — 99499 UNLISTED E&M SERVICE: CPT | Mod: S$GLB,,, | Performed by: PEDIATRICS

## 2021-01-04 PROCEDURE — 95251 PR GLUCOSE MONITOR, 72 HOUR, PHYS INTERP: ICD-10-PCS | Mod: S$GLB,,, | Performed by: PHYSICIAN ASSISTANT

## 2021-01-05 ENCOUNTER — PATIENT MESSAGE (OUTPATIENT)
Dept: DIABETES | Facility: CLINIC | Age: 26
End: 2021-01-05

## 2021-01-05 ENCOUNTER — TELEPHONE (OUTPATIENT)
Dept: DIABETES | Facility: CLINIC | Age: 26
End: 2021-01-05

## 2021-01-05 DIAGNOSIS — E10.65 UNCONTROLLED TYPE 1 DIABETES MELLITUS WITH HYPERGLYCEMIA: Primary | ICD-10-CM

## 2021-01-05 RX ORDER — INSULIN DEGLUDEC 200 U/ML
33 INJECTION, SOLUTION SUBCUTANEOUS DAILY
Qty: 6 ML | Refills: 11 | Status: SHIPPED | OUTPATIENT
Start: 2021-01-05 | End: 2021-03-01 | Stop reason: SDUPTHER

## 2021-01-14 ENCOUNTER — CLINICAL SUPPORT (OUTPATIENT)
Dept: DIABETES | Facility: CLINIC | Age: 26
End: 2021-01-14
Payer: COMMERCIAL

## 2021-01-14 DIAGNOSIS — E10.65 UNCONTROLLED TYPE 1 DIABETES MELLITUS WITH HYPERGLYCEMIA: ICD-10-CM

## 2021-01-14 PROCEDURE — 99999 PR PBB SHADOW E&M-EST. PATIENT-LVL II: ICD-10-PCS | Mod: PBBFAC,,, | Performed by: DIETITIAN, REGISTERED

## 2021-01-14 PROCEDURE — G0108 PR DIAB MANAGE TRN  PER INDIV: ICD-10-PCS | Mod: S$GLB,,, | Performed by: DIETITIAN, REGISTERED

## 2021-01-14 PROCEDURE — 99999 PR PBB SHADOW E&M-EST. PATIENT-LVL II: CPT | Mod: PBBFAC,,, | Performed by: DIETITIAN, REGISTERED

## 2021-01-14 PROCEDURE — G0108 DIAB MANAGE TRN  PER INDIV: HCPCS | Mod: S$GLB,,, | Performed by: DIETITIAN, REGISTERED

## 2021-02-01 ENCOUNTER — CLINICAL SUPPORT (OUTPATIENT)
Dept: DIABETES | Facility: CLINIC | Age: 26
End: 2021-02-01
Payer: COMMERCIAL

## 2021-02-01 DIAGNOSIS — E10.65 UNCONTROLLED TYPE 1 DIABETES MELLITUS WITH HYPERGLYCEMIA: Primary | ICD-10-CM

## 2021-02-01 PROCEDURE — G0108 PR DIAB MANAGE TRN  PER INDIV: ICD-10-PCS | Mod: S$GLB,,, | Performed by: DIETITIAN, REGISTERED

## 2021-02-01 PROCEDURE — G0108 DIAB MANAGE TRN  PER INDIV: HCPCS | Mod: S$GLB,,, | Performed by: DIETITIAN, REGISTERED

## 2021-02-03 ENCOUNTER — PATIENT MESSAGE (OUTPATIENT)
Dept: DIABETES | Facility: CLINIC | Age: 26
End: 2021-02-03

## 2021-02-03 DIAGNOSIS — E10.65 UNCONTROLLED TYPE 1 DIABETES MELLITUS WITH HYPERGLYCEMIA: Primary | ICD-10-CM

## 2021-02-03 RX ORDER — INSULIN ASPART 100 [IU]/ML
INJECTION, SOLUTION INTRAVENOUS; SUBCUTANEOUS
Qty: 20 ML | Refills: 11 | Status: SHIPPED | OUTPATIENT
Start: 2021-02-03 | End: 2021-03-01 | Stop reason: SDUPTHER

## 2021-02-22 RX ORDER — FLUCONAZOLE 150 MG/1
TABLET ORAL
COMMUNITY
Start: 2021-02-17 | End: 2021-05-25

## 2021-02-22 RX ORDER — BLOOD SUGAR DIAGNOSTIC
1 STRIP MISCELLANEOUS 4 TIMES DAILY
Qty: 100 EACH | Refills: 11 | Status: SHIPPED | OUTPATIENT
Start: 2021-02-22 | End: 2021-03-01 | Stop reason: SDUPTHER

## 2021-03-01 ENCOUNTER — PATIENT MESSAGE (OUTPATIENT)
Dept: DIABETES | Facility: CLINIC | Age: 26
End: 2021-03-01

## 2021-03-01 DIAGNOSIS — E10.65 UNCONTROLLED TYPE 1 DIABETES MELLITUS WITH HYPERGLYCEMIA: ICD-10-CM

## 2021-03-01 RX ORDER — BLOOD SUGAR DIAGNOSTIC
1 STRIP MISCELLANEOUS 4 TIMES DAILY
Qty: 100 EACH | Refills: 11 | Status: SHIPPED | OUTPATIENT
Start: 2021-03-01 | End: 2022-06-10 | Stop reason: SDUPTHER

## 2021-03-01 RX ORDER — INSULIN DEGLUDEC 200 U/ML
33 INJECTION, SOLUTION SUBCUTANEOUS DAILY
Qty: 9 ML | Refills: 11 | Status: SHIPPED | OUTPATIENT
Start: 2021-03-01 | End: 2021-10-28 | Stop reason: SDUPTHER

## 2021-03-01 RX ORDER — INSULIN ASPART 100 [IU]/ML
INJECTION, SOLUTION INTRAVENOUS; SUBCUTANEOUS
Qty: 30 ML | Refills: 11 | Status: SHIPPED | OUTPATIENT
Start: 2021-03-01 | End: 2021-05-25 | Stop reason: SDUPTHER

## 2021-03-02 ENCOUNTER — IMMUNIZATION (OUTPATIENT)
Dept: PHARMACY | Facility: CLINIC | Age: 26
End: 2021-03-02
Payer: COMMERCIAL

## 2021-03-02 DIAGNOSIS — Z23 NEED FOR VACCINATION: Primary | ICD-10-CM

## 2021-03-03 ENCOUNTER — OFFICE VISIT (OUTPATIENT)
Dept: DIABETES | Facility: CLINIC | Age: 26
End: 2021-03-03
Payer: COMMERCIAL

## 2021-03-03 DIAGNOSIS — E10.65 UNCONTROLLED TYPE 1 DIABETES MELLITUS WITH HYPERGLYCEMIA: Primary | ICD-10-CM

## 2021-03-03 DIAGNOSIS — Z53.21 PATIENT LEFT WITHOUT BEING SEEN: Primary | ICD-10-CM

## 2021-03-03 PROCEDURE — 3072F LOW RISK FOR RETINOPATHY: CPT | Mod: ,,, | Performed by: PHYSICIAN ASSISTANT

## 2021-03-03 PROCEDURE — 3072F PR LOW RISK FOR RETINOPATHY: ICD-10-PCS | Mod: ,,, | Performed by: PHYSICIAN ASSISTANT

## 2021-03-03 PROCEDURE — 99499 UNLISTED E&M SERVICE: CPT | Mod: 95,,, | Performed by: PHYSICIAN ASSISTANT

## 2021-03-03 PROCEDURE — 3046F HEMOGLOBIN A1C LEVEL >9.0%: CPT | Mod: CPTII,,, | Performed by: PHYSICIAN ASSISTANT

## 2021-03-03 PROCEDURE — 99499 NO LOS: ICD-10-PCS | Mod: 95,,, | Performed by: PHYSICIAN ASSISTANT

## 2021-03-03 PROCEDURE — 99214 OFFICE O/P EST MOD 30 MIN: CPT | Mod: 95,,, | Performed by: PHYSICIAN ASSISTANT

## 2021-03-03 PROCEDURE — 99214 PR OFFICE/OUTPT VISIT, EST, LEVL IV, 30-39 MIN: ICD-10-PCS | Mod: 95,,, | Performed by: PHYSICIAN ASSISTANT

## 2021-03-03 PROCEDURE — 3046F PR MOST RECENT HEMOGLOBIN A1C LEVEL > 9.0%: ICD-10-PCS | Mod: CPTII,,, | Performed by: PHYSICIAN ASSISTANT

## 2021-03-23 ENCOUNTER — TELEPHONE (OUTPATIENT)
Dept: DIABETES | Facility: CLINIC | Age: 26
End: 2021-03-23

## 2021-05-24 ENCOUNTER — PATIENT MESSAGE (OUTPATIENT)
Dept: INTERNAL MEDICINE | Facility: CLINIC | Age: 26
End: 2021-05-24

## 2021-05-24 ENCOUNTER — TELEPHONE (OUTPATIENT)
Dept: INTERNAL MEDICINE | Facility: CLINIC | Age: 26
End: 2021-05-24

## 2021-05-25 ENCOUNTER — OFFICE VISIT (OUTPATIENT)
Dept: INTERNAL MEDICINE | Facility: CLINIC | Age: 26
End: 2021-05-25
Payer: COMMERCIAL

## 2021-05-25 VITALS
TEMPERATURE: 99 F | SYSTOLIC BLOOD PRESSURE: 122 MMHG | WEIGHT: 231.06 LBS | HEIGHT: 64 IN | HEART RATE: 78 BPM | BODY MASS INDEX: 39.45 KG/M2 | DIASTOLIC BLOOD PRESSURE: 70 MMHG

## 2021-05-25 DIAGNOSIS — E10.65 UNCONTROLLED TYPE 1 DIABETES MELLITUS WITH HYPERGLYCEMIA: ICD-10-CM

## 2021-05-25 DIAGNOSIS — Z00.00 ROUTINE GENERAL MEDICAL EXAMINATION AT A HEALTH CARE FACILITY: Primary | ICD-10-CM

## 2021-05-25 DIAGNOSIS — E10.65 TYPE 1 DIABETES MELLITUS WITH HYPERGLYCEMIA: ICD-10-CM

## 2021-05-25 PROCEDURE — 99214 OFFICE O/P EST MOD 30 MIN: CPT | Mod: 25,S$GLB,, | Performed by: FAMILY MEDICINE

## 2021-05-25 PROCEDURE — 99395 PR PREVENTIVE VISIT,EST,18-39: ICD-10-PCS | Mod: S$GLB,,, | Performed by: FAMILY MEDICINE

## 2021-05-25 PROCEDURE — 3072F PR LOW RISK FOR RETINOPATHY: ICD-10-PCS | Mod: S$GLB,,, | Performed by: FAMILY MEDICINE

## 2021-05-25 PROCEDURE — 99999 PR PBB SHADOW E&M-EST. PATIENT-LVL IV: CPT | Mod: PBBFAC,,, | Performed by: FAMILY MEDICINE

## 2021-05-25 PROCEDURE — 1126F PR PAIN SEVERITY QUANTIFIED, NO PAIN PRESENT: ICD-10-PCS | Mod: S$GLB,,, | Performed by: FAMILY MEDICINE

## 2021-05-25 PROCEDURE — 3008F PR BODY MASS INDEX (BMI) DOCUMENTED: ICD-10-PCS | Mod: CPTII,S$GLB,, | Performed by: FAMILY MEDICINE

## 2021-05-25 PROCEDURE — 99395 PREV VISIT EST AGE 18-39: CPT | Mod: S$GLB,,, | Performed by: FAMILY MEDICINE

## 2021-05-25 PROCEDURE — 3072F LOW RISK FOR RETINOPATHY: CPT | Mod: S$GLB,,, | Performed by: FAMILY MEDICINE

## 2021-05-25 PROCEDURE — 1126F AMNT PAIN NOTED NONE PRSNT: CPT | Mod: S$GLB,,, | Performed by: FAMILY MEDICINE

## 2021-05-25 PROCEDURE — 3008F BODY MASS INDEX DOCD: CPT | Mod: CPTII,S$GLB,, | Performed by: FAMILY MEDICINE

## 2021-05-25 PROCEDURE — 99214 PR OFFICE/OUTPT VISIT, EST, LEVL IV, 30-39 MIN: ICD-10-PCS | Mod: 25,S$GLB,, | Performed by: FAMILY MEDICINE

## 2021-05-25 PROCEDURE — 99999 PR PBB SHADOW E&M-EST. PATIENT-LVL IV: ICD-10-PCS | Mod: PBBFAC,,, | Performed by: FAMILY MEDICINE

## 2021-05-25 RX ORDER — PROMETHAZINE HYDROCHLORIDE 25 MG/1
25-50 TABLET ORAL EVERY 8 HOURS PRN
COMMUNITY
Start: 2021-04-23 | End: 2023-09-15

## 2021-05-25 RX ORDER — ESCITALOPRAM OXALATE 20 MG/1
20 TABLET ORAL DAILY
Qty: 30 TABLET | Refills: 0 | Status: SHIPPED | OUTPATIENT
Start: 2021-05-25 | End: 2021-07-16 | Stop reason: SDUPTHER

## 2021-05-25 RX ORDER — BUSPIRONE HYDROCHLORIDE 10 MG/1
10 TABLET ORAL 3 TIMES DAILY PRN
Qty: 90 TABLET | Refills: 0 | Status: SHIPPED | OUTPATIENT
Start: 2021-05-25 | End: 2021-07-16 | Stop reason: SDUPTHER

## 2021-05-25 RX ORDER — INSULIN ASPART 100 [IU]/ML
INJECTION, SOLUTION INTRAVENOUS; SUBCUTANEOUS
Qty: 30 ML | Refills: 1 | Status: SHIPPED | OUTPATIENT
Start: 2021-05-25 | End: 2021-06-11 | Stop reason: SDUPTHER

## 2021-05-25 RX ORDER — CEPHALEXIN 500 MG/1
500 CAPSULE ORAL 2 TIMES DAILY
COMMUNITY
Start: 2021-05-24 | End: 2021-05-31

## 2021-06-01 ENCOUNTER — HOSPITAL ENCOUNTER (OUTPATIENT)
Dept: CARDIOLOGY | Facility: HOSPITAL | Age: 26
Discharge: HOME | End: 2021-06-01
Attending: FAMILY MEDICINE
Payer: COMMERCIAL

## 2021-06-01 DIAGNOSIS — Z00.00 ROUTINE GENERAL MEDICAL EXAMINATION AT A HEALTH CARE FACILITY: ICD-10-CM

## 2021-06-01 PROCEDURE — 93010 EKG 12-LEAD: ICD-10-PCS | Mod: ,,, | Performed by: INTERNAL MEDICINE

## 2021-06-01 PROCEDURE — 93005 ELECTROCARDIOGRAM TRACING: CPT | Mod: PO

## 2021-06-01 PROCEDURE — 93010 ELECTROCARDIOGRAM REPORT: CPT | Mod: ,,, | Performed by: INTERNAL MEDICINE

## 2021-06-02 ENCOUNTER — PATIENT MESSAGE (OUTPATIENT)
Dept: DIABETES | Facility: CLINIC | Age: 26
End: 2021-06-02

## 2021-06-03 ENCOUNTER — PATIENT MESSAGE (OUTPATIENT)
Dept: INTERNAL MEDICINE | Facility: CLINIC | Age: 26
End: 2021-06-03

## 2021-06-11 ENCOUNTER — PATIENT MESSAGE (OUTPATIENT)
Dept: DIABETES | Facility: CLINIC | Age: 26
End: 2021-06-11

## 2021-06-11 DIAGNOSIS — E10.65 UNCONTROLLED TYPE 1 DIABETES MELLITUS WITH HYPERGLYCEMIA: ICD-10-CM

## 2021-06-11 RX ORDER — FLUCONAZOLE 150 MG/1
TABLET ORAL
COMMUNITY
Start: 2021-06-01 | End: 2021-07-16

## 2021-06-11 RX ORDER — INSULIN ASPART 100 [IU]/ML
INJECTION, SOLUTION INTRAVENOUS; SUBCUTANEOUS
Qty: 30 ML | Refills: 1 | Status: SHIPPED | OUTPATIENT
Start: 2021-06-11 | End: 2021-09-03 | Stop reason: SDUPTHER

## 2021-06-28 ENCOUNTER — TELEPHONE (OUTPATIENT)
Dept: INTERNAL MEDICINE | Facility: CLINIC | Age: 26
End: 2021-06-28

## 2021-07-16 ENCOUNTER — OFFICE VISIT (OUTPATIENT)
Dept: INTERNAL MEDICINE | Facility: CLINIC | Age: 26
End: 2021-07-16
Payer: COMMERCIAL

## 2021-07-16 DIAGNOSIS — E10.65 TYPE 1 DIABETES MELLITUS WITH HYPERGLYCEMIA: ICD-10-CM

## 2021-07-16 DIAGNOSIS — F41.1 GAD (GENERALIZED ANXIETY DISORDER): Primary | ICD-10-CM

## 2021-07-16 DIAGNOSIS — K92.1 HEMATOCHEZIA: ICD-10-CM

## 2021-07-16 PROCEDURE — 3046F HEMOGLOBIN A1C LEVEL >9.0%: CPT | Mod: CPTII,,, | Performed by: FAMILY MEDICINE

## 2021-07-16 PROCEDURE — 99214 PR OFFICE/OUTPT VISIT, EST, LEVL IV, 30-39 MIN: ICD-10-PCS | Mod: 95,,, | Performed by: FAMILY MEDICINE

## 2021-07-16 PROCEDURE — 3072F LOW RISK FOR RETINOPATHY: CPT | Mod: ,,, | Performed by: FAMILY MEDICINE

## 2021-07-16 PROCEDURE — 3072F PR LOW RISK FOR RETINOPATHY: ICD-10-PCS | Mod: ,,, | Performed by: FAMILY MEDICINE

## 2021-07-16 PROCEDURE — 99214 OFFICE O/P EST MOD 30 MIN: CPT | Mod: 95,,, | Performed by: FAMILY MEDICINE

## 2021-07-16 PROCEDURE — 3046F PR MOST RECENT HEMOGLOBIN A1C LEVEL > 9.0%: ICD-10-PCS | Mod: CPTII,,, | Performed by: FAMILY MEDICINE

## 2021-07-16 RX ORDER — ESCITALOPRAM OXALATE 20 MG/1
20 TABLET ORAL DAILY
Qty: 90 TABLET | Refills: 0 | Status: SHIPPED | OUTPATIENT
Start: 2021-07-16 | End: 2021-11-26 | Stop reason: SDUPTHER

## 2021-07-16 RX ORDER — BUSPIRONE HYDROCHLORIDE 10 MG/1
10 TABLET ORAL 3 TIMES DAILY PRN
Qty: 90 TABLET | Refills: 0 | Status: SHIPPED | OUTPATIENT
Start: 2021-07-16 | End: 2021-08-16

## 2021-08-13 ENCOUNTER — PATIENT MESSAGE (OUTPATIENT)
Dept: DIABETES | Facility: CLINIC | Age: 26
End: 2021-08-13

## 2021-08-24 PROBLEM — E10.65 UNCONTROLLED TYPE 1 DIABETES MELLITUS WITH HYPERGLYCEMIA: Status: RESOLVED | Noted: 2020-12-15 | Resolved: 2021-08-24

## 2021-09-03 ENCOUNTER — PATIENT MESSAGE (OUTPATIENT)
Dept: DIABETES | Facility: CLINIC | Age: 26
End: 2021-09-03

## 2021-09-03 DIAGNOSIS — E10.65 UNCONTROLLED TYPE 1 DIABETES MELLITUS WITH HYPERGLYCEMIA: ICD-10-CM

## 2021-09-03 RX ORDER — INSULIN ASPART 100 [IU]/ML
INJECTION, SOLUTION INTRAVENOUS; SUBCUTANEOUS
Qty: 30 ML | Refills: 1 | Status: SHIPPED | OUTPATIENT
Start: 2021-09-03 | End: 2021-09-24 | Stop reason: SDUPTHER

## 2021-09-24 ENCOUNTER — PATIENT MESSAGE (OUTPATIENT)
Dept: DIABETES | Facility: CLINIC | Age: 26
End: 2021-09-24

## 2021-10-13 DIAGNOSIS — F41.1 GAD (GENERALIZED ANXIETY DISORDER): ICD-10-CM

## 2021-10-14 RX ORDER — ESCITALOPRAM OXALATE 20 MG/1
TABLET ORAL
Qty: 90 TABLET | Refills: 0 | OUTPATIENT
Start: 2021-10-14

## 2021-10-25 PROBLEM — J30.9 ALLERGIC RHINITIS: Status: ACTIVE | Noted: 2021-10-25

## 2021-10-25 PROBLEM — K92.1 HEMATOCHEZIA: Status: RESOLVED | Noted: 2021-07-16 | Resolved: 2021-10-25

## 2021-10-28 ENCOUNTER — PATIENT MESSAGE (OUTPATIENT)
Dept: DIABETES | Facility: CLINIC | Age: 26
End: 2021-10-28
Payer: COMMERCIAL

## 2021-10-28 DIAGNOSIS — E10.65 UNCONTROLLED TYPE 1 DIABETES MELLITUS WITH HYPERGLYCEMIA: ICD-10-CM

## 2021-10-28 RX ORDER — INSULIN DEGLUDEC 200 U/ML
33 INJECTION, SOLUTION SUBCUTANEOUS DAILY
Qty: 2 PEN | Refills: 10 | Status: SHIPPED | OUTPATIENT
Start: 2021-10-28 | End: 2022-05-30 | Stop reason: SDUPTHER

## 2021-11-22 ENCOUNTER — PATIENT MESSAGE (OUTPATIENT)
Dept: INTERNAL MEDICINE | Facility: CLINIC | Age: 26
End: 2021-11-22
Payer: COMMERCIAL

## 2021-11-24 ENCOUNTER — PATIENT OUTREACH (OUTPATIENT)
Dept: ADMINISTRATIVE | Facility: HOSPITAL | Age: 26
End: 2021-11-24
Payer: COMMERCIAL

## 2021-11-24 DIAGNOSIS — E10.65 UNCONTROLLED TYPE 1 DIABETES MELLITUS WITH HYPERGLYCEMIA: Primary | ICD-10-CM

## 2021-11-24 DIAGNOSIS — E10.65 TYPE 1 DIABETES MELLITUS WITH HYPERGLYCEMIA: ICD-10-CM

## 2021-11-26 ENCOUNTER — OFFICE VISIT (OUTPATIENT)
Dept: INTERNAL MEDICINE | Facility: CLINIC | Age: 26
End: 2021-11-26
Payer: COMMERCIAL

## 2021-11-26 DIAGNOSIS — E10.65 TYPE 1 DIABETES MELLITUS WITH HYPERGLYCEMIA: ICD-10-CM

## 2021-11-26 DIAGNOSIS — F41.1 GAD (GENERALIZED ANXIETY DISORDER): ICD-10-CM

## 2021-11-26 DIAGNOSIS — R51.9 NONINTRACTABLE HEADACHE, UNSPECIFIED CHRONICITY PATTERN, UNSPECIFIED HEADACHE TYPE: Primary | ICD-10-CM

## 2021-11-26 PROCEDURE — 3072F LOW RISK FOR RETINOPATHY: CPT | Mod: CPTII,95,, | Performed by: FAMILY MEDICINE

## 2021-11-26 PROCEDURE — 99214 OFFICE O/P EST MOD 30 MIN: CPT | Mod: 95,,, | Performed by: FAMILY MEDICINE

## 2021-11-26 PROCEDURE — 3072F PR LOW RISK FOR RETINOPATHY: ICD-10-PCS | Mod: CPTII,95,, | Performed by: FAMILY MEDICINE

## 2021-11-26 PROCEDURE — 99214 PR OFFICE/OUTPT VISIT, EST, LEVL IV, 30-39 MIN: ICD-10-PCS | Mod: 95,,, | Performed by: FAMILY MEDICINE

## 2021-11-26 RX ORDER — ONDANSETRON 4 MG/1
8 TABLET, ORALLY DISINTEGRATING ORAL 2 TIMES DAILY
Qty: 30 TABLET | Refills: 0 | Status: SHIPPED | OUTPATIENT
Start: 2021-11-26

## 2021-11-26 RX ORDER — ESCITALOPRAM OXALATE 20 MG/1
20 TABLET ORAL DAILY
Qty: 30 TABLET | Refills: 0 | Status: SHIPPED | OUTPATIENT
Start: 2021-11-26 | End: 2022-02-11

## 2021-11-26 RX ORDER — BUTALBITAL, ACETAMINOPHEN AND CAFFEINE 50; 325; 40 MG/1; MG/1; MG/1
1 TABLET ORAL EVERY 4 HOURS PRN
Qty: 30 TABLET | Refills: 0 | Status: SHIPPED | OUTPATIENT
Start: 2021-11-26 | End: 2022-11-04 | Stop reason: SDUPTHER

## 2021-12-23 DIAGNOSIS — F41.1 GAD (GENERALIZED ANXIETY DISORDER): ICD-10-CM

## 2021-12-23 RX ORDER — ESCITALOPRAM OXALATE 20 MG/1
TABLET ORAL
Qty: 30 TABLET | Refills: 0 | OUTPATIENT
Start: 2021-12-23

## 2021-12-28 ENCOUNTER — PATIENT MESSAGE (OUTPATIENT)
Dept: ADMINISTRATIVE | Facility: HOSPITAL | Age: 26
End: 2021-12-28
Payer: COMMERCIAL

## 2022-02-09 ENCOUNTER — OFFICE VISIT (OUTPATIENT)
Dept: INTERNAL MEDICINE | Facility: CLINIC | Age: 27
End: 2022-02-09
Payer: COMMERCIAL

## 2022-02-09 VITALS
TEMPERATURE: 98 F | OXYGEN SATURATION: 99 % | HEIGHT: 65 IN | WEIGHT: 237 LBS | SYSTOLIC BLOOD PRESSURE: 122 MMHG | BODY MASS INDEX: 39.49 KG/M2 | HEART RATE: 98 BPM | DIASTOLIC BLOOD PRESSURE: 86 MMHG

## 2022-02-09 DIAGNOSIS — J06.9 VIRAL URI WITH COUGH: Primary | ICD-10-CM

## 2022-02-09 DIAGNOSIS — E10.65 TYPE 1 DIABETES MELLITUS WITH HYPERGLYCEMIA: ICD-10-CM

## 2022-02-09 LAB
CTP QC/QA: YES
SARS-COV-2 RDRP RESP QL NAA+PROBE: NEGATIVE

## 2022-02-09 PROCEDURE — 99999 PR PBB SHADOW E&M-EST. PATIENT-LVL IV: ICD-10-PCS | Mod: PBBFAC,,, | Performed by: NURSE PRACTITIONER

## 2022-02-09 PROCEDURE — U0002 COVID-19 LAB TEST NON-CDC: HCPCS | Mod: QW,S$GLB,, | Performed by: NURSE PRACTITIONER

## 2022-02-09 PROCEDURE — 3079F DIAST BP 80-89 MM HG: CPT | Mod: CPTII,S$GLB,, | Performed by: NURSE PRACTITIONER

## 2022-02-09 PROCEDURE — 1159F PR MEDICATION LIST DOCUMENTED IN MEDICAL RECORD: ICD-10-PCS | Mod: CPTII,S$GLB,, | Performed by: NURSE PRACTITIONER

## 2022-02-09 PROCEDURE — 3008F PR BODY MASS INDEX (BMI) DOCUMENTED: ICD-10-PCS | Mod: CPTII,S$GLB,, | Performed by: NURSE PRACTITIONER

## 2022-02-09 PROCEDURE — 1160F PR REVIEW ALL MEDS BY PRESCRIBER/CLIN PHARMACIST DOCUMENTED: ICD-10-PCS | Mod: CPTII,S$GLB,, | Performed by: NURSE PRACTITIONER

## 2022-02-09 PROCEDURE — 1159F MED LIST DOCD IN RCRD: CPT | Mod: CPTII,S$GLB,, | Performed by: NURSE PRACTITIONER

## 2022-02-09 PROCEDURE — 3074F PR MOST RECENT SYSTOLIC BLOOD PRESSURE < 130 MM HG: ICD-10-PCS | Mod: CPTII,S$GLB,, | Performed by: NURSE PRACTITIONER

## 2022-02-09 PROCEDURE — 99999 PR PBB SHADOW E&M-EST. PATIENT-LVL IV: CPT | Mod: PBBFAC,,, | Performed by: NURSE PRACTITIONER

## 2022-02-09 PROCEDURE — 99213 PR OFFICE/OUTPT VISIT, EST, LEVL III, 20-29 MIN: ICD-10-PCS | Mod: S$GLB,,, | Performed by: NURSE PRACTITIONER

## 2022-02-09 PROCEDURE — 99213 OFFICE O/P EST LOW 20 MIN: CPT | Mod: S$GLB,,, | Performed by: NURSE PRACTITIONER

## 2022-02-09 PROCEDURE — 3074F SYST BP LT 130 MM HG: CPT | Mod: CPTII,S$GLB,, | Performed by: NURSE PRACTITIONER

## 2022-02-09 PROCEDURE — 1160F RVW MEDS BY RX/DR IN RCRD: CPT | Mod: CPTII,S$GLB,, | Performed by: NURSE PRACTITIONER

## 2022-02-09 PROCEDURE — 3079F PR MOST RECENT DIASTOLIC BLOOD PRESSURE 80-89 MM HG: ICD-10-PCS | Mod: CPTII,S$GLB,, | Performed by: NURSE PRACTITIONER

## 2022-02-09 PROCEDURE — U0002: ICD-10-PCS | Mod: QW,S$GLB,, | Performed by: NURSE PRACTITIONER

## 2022-02-09 PROCEDURE — 3008F BODY MASS INDEX DOCD: CPT | Mod: CPTII,S$GLB,, | Performed by: NURSE PRACTITIONER

## 2022-02-09 RX ORDER — PROMETHAZINE HYDROCHLORIDE AND DEXTROMETHORPHAN HYDROBROMIDE 6.25; 15 MG/5ML; MG/5ML
5 SYRUP ORAL NIGHTLY PRN
Qty: 120 ML | Refills: 0 | Status: SHIPPED | OUTPATIENT
Start: 2022-02-09 | End: 2022-04-19 | Stop reason: SDUPTHER

## 2022-02-09 RX ORDER — FLUTICASONE PROPIONATE 50 MCG
2 SPRAY, SUSPENSION (ML) NASAL DAILY
Qty: 16 G | Refills: 0 | Status: SHIPPED | OUTPATIENT
Start: 2022-02-09 | End: 2022-03-08

## 2022-02-09 NOTE — PROGRESS NOTES
"Subjective:       Patient ID: Chey Chavira is a 26 y.o. female.    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 Pertinent negatives include no chest pain, chills, fever, rash or shortness of breath.   Sore Throat   Associated symptoms include congestion and coughing. Pertinent negatives include no shortness of breath.       /86   Pulse 98   Temp 98.2 °F (36.8 °C)   Ht 5' 5" (1.651 m)   Wt 107.5 kg (236 lb 15.9 oz)   SpO2 99%   BMI 39.44 kg/m²     Review of Systems   Constitutional: Positive for activity change. Negative for appetite change, chills, diaphoresis, fatigue, fever and unexpected weight change.   HENT: Positive for congestion, postnasal drip and sore throat.    Respiratory: Positive for cough. Negative for shortness of breath.    Cardiovascular: Negative for chest pain, palpitations and leg swelling.   Gastrointestinal: Negative.    Genitourinary: Negative.    Musculoskeletal: Negative.    Skin: Negative for color change, pallor, rash and wound.   Allergic/Immunologic: Negative for immunocompromised state.   Neurological: Negative.  Negative for dizziness and facial asymmetry.   Hematological: Negative for adenopathy. Does not bruise/bleed easily.   Psychiatric/Behavioral: Negative for agitation, behavioral problems and confusion.       Objective:      Physical Exam  Vitals and nursing note reviewed.   Constitutional:       " Appearance: She is well-developed and well-nourished. She is not diaphoretic.   HENT:      Head: Normocephalic and atraumatic.      Right Ear: Tympanic membrane, ear canal and external ear normal.      Left Ear: Tympanic membrane, ear canal and external ear normal.      Nose: Mucosal edema and rhinorrhea present.      Right Sinus: No maxillary sinus tenderness or frontal sinus tenderness.      Left Sinus: No maxillary sinus tenderness or frontal sinus tenderness.      Mouth/Throat:      Mouth: Oropharynx is clear and moist and mucous membranes are normal.      Pharynx: Uvula midline. No oropharyngeal exudate, posterior oropharyngeal edema or posterior oropharyngeal erythema.   Eyes:      General:         Right eye: No discharge.         Left eye: No discharge.      Conjunctiva/sclera: Conjunctivae normal.   Cardiovascular:      Rate and Rhythm: Normal rate and regular rhythm.      Heart sounds: Normal heart sounds. No murmur heard.      Pulmonary:      Effort: Pulmonary effort is normal. No accessory muscle usage or respiratory distress.      Breath sounds: Normal breath sounds. No decreased breath sounds, wheezing, rhonchi or rales.   Chest:      Chest wall: No tenderness.   Abdominal:      General: There is no distension.      Palpations: Abdomen is soft.   Musculoskeletal:         General: Normal range of motion.      Cervical back: Normal range of motion.   Skin:     General: Skin is warm and dry.   Neurological:      Mental Status: She is alert and oriented to person, place, and time.   Psychiatric:         Mood and Affect: Mood and affect normal.         Behavior: Behavior normal.         Assessment:       1. Viral URI with cough    2. Type 1 diabetes mellitus with hyperglycemia        Plan:       Chey was seen today for cough and sore throat.    Diagnoses and all orders for this visit:    Viral URI with cough  -     POCT COVID-19 Rapid Screening  -     fluticasone propionate (FLONASE) 50 mcg/actuation  nasal spray; 2 sprays (100 mcg total) by Each Nostril route once daily.  -     promethazine-dextromethorphan (PROMETHAZINE-DM) 6.25-15 mg/5 mL Syrp; Take 5 mLs by mouth nightly as needed (Cough).    Type 1 diabetes mellitus with hyperglycemia    covid Negative   recommended rest, hydration, supportive care, deep breathing  Vitamins c, d, zinc discussed  Zyrtec d for head congestion/ear pressure/pnd  Follow up Dr. Mcdonald for sugars  Emergency Room if shortness of breath, issues breathing

## 2022-02-09 NOTE — LETTER
February 9, 2022      [facility]  [address] [place] [address]  Phone: [phone]  Fax: [phone]       Patient: Chey Chavira   YOB: 1995  Date of Visit: 02/09/2022    To Whom It May Concern:    Raul Chavira  was at [facility] on 02/09/2022. The patient may return to work/school on 02/10/2022 With no restrictions. If you have any questions or concerns, or if I can be of further assistance, please do not hesitate to contact me.    Sincerely,    Jaclyn Jasso MA

## 2022-02-11 ENCOUNTER — OFFICE VISIT (OUTPATIENT)
Dept: INTERNAL MEDICINE | Facility: CLINIC | Age: 27
End: 2022-02-11
Payer: COMMERCIAL

## 2022-02-11 DIAGNOSIS — F41.1 GAD (GENERALIZED ANXIETY DISORDER): ICD-10-CM

## 2022-02-11 PROCEDURE — 1159F PR MEDICATION LIST DOCUMENTED IN MEDICAL RECORD: ICD-10-PCS | Mod: CPTII,95,, | Performed by: NURSE PRACTITIONER

## 2022-02-11 PROCEDURE — 99214 OFFICE O/P EST MOD 30 MIN: CPT | Mod: 95,,, | Performed by: NURSE PRACTITIONER

## 2022-02-11 PROCEDURE — 99214 PR OFFICE/OUTPT VISIT, EST, LEVL IV, 30-39 MIN: ICD-10-PCS | Mod: 95,,, | Performed by: NURSE PRACTITIONER

## 2022-02-11 PROCEDURE — 1160F PR REVIEW ALL MEDS BY PRESCRIBER/CLIN PHARMACIST DOCUMENTED: ICD-10-PCS | Mod: CPTII,95,, | Performed by: NURSE PRACTITIONER

## 2022-02-11 PROCEDURE — 1160F RVW MEDS BY RX/DR IN RCRD: CPT | Mod: CPTII,95,, | Performed by: NURSE PRACTITIONER

## 2022-02-11 PROCEDURE — 1159F MED LIST DOCD IN RCRD: CPT | Mod: CPTII,95,, | Performed by: NURSE PRACTITIONER

## 2022-02-11 RX ORDER — SERTRALINE HYDROCHLORIDE 100 MG/1
100 TABLET, FILM COATED ORAL DAILY
Qty: 30 TABLET | Refills: 1 | Status: SHIPPED | OUTPATIENT
Start: 2022-02-11 | End: 2022-04-19 | Stop reason: SDUPTHER

## 2022-02-11 RX ORDER — BUSPIRONE HYDROCHLORIDE 10 MG/1
10 TABLET ORAL 3 TIMES DAILY
Qty: 90 TABLET | Refills: 1 | Status: SHIPPED | OUTPATIENT
Start: 2022-02-11 | End: 2022-04-19 | Stop reason: SDUPTHER

## 2022-02-11 NOTE — PROGRESS NOTES
Subjective:       Patient ID: Chey Chavira is a 26 y.o. female.    Chief Complaint: No chief complaint on file.    The patient location is: home  The chief complaint leading to consultation is: refill    Visit type: audiovisual    Face to Face time with patient: 15 minutes  20 minutes of total time spent on the encounter, which includes face to face time and non-face to face time preparing to see the patient (eg, review of tests), Obtaining and/or reviewing separately obtained history, Documenting clinical information in the electronic or other health record, Independently interpreting results (not separately reported) and communicating results to the patient/family/caregiver, or Care coordination (not separately reported).         Each patient to whom he or she provides medical services by telemedicine is:  (1) informed of the relationship between the physician and patient and the respective role of any other health care provider with respect to management of the patient; and (2) notified that he or she may decline to receive medical services by telemedicine and may withdraw from such care at any time.    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    Review of Systems   Constitutional: Negative for activity change and unexpected weight change.   HENT: Negative for hearing loss, rhinorrhea and trouble swallowing.    Eyes: Negative for discharge and visual disturbance.   Respiratory: Negative for chest tightness and wheezing.    Cardiovascular: Positive for palpitations. Negative for chest pain.   Gastrointestinal: Negative  for blood in stool, constipation, diarrhea and vomiting.   Endocrine: Negative for polydipsia and polyuria.   Genitourinary: Negative for difficulty urinating, dysuria, hematuria and menstrual problem.   Musculoskeletal: Negative for arthralgias, joint swelling and neck pain.   Neurological: Positive for headaches. Negative for weakness.   Psychiatric/Behavioral: Positive for dysphoric mood. Negative for confusion.       Objective:      Physical Exam  Constitutional:       General: She is not in acute distress.     Appearance: She is well-developed and well-nourished. She is not diaphoretic.   HENT:      Head: Normocephalic and atraumatic.      Right Ear: External ear normal.      Left Ear: External ear normal.   Eyes:      General: No scleral icterus.        Right eye: No discharge.         Left eye: No discharge.      Conjunctiva/sclera: Conjunctivae normal.   Pulmonary:      Effort: Pulmonary effort is normal. No tachypnea, accessory muscle usage or respiratory distress.      Breath sounds: No stridor.   Musculoskeletal:      Cervical back: Normal range of motion and neck supple.   Skin:     Findings: No rash.   Neurological:      Mental Status: She is alert. She is not disoriented.   Psychiatric:         Attention and Perception: She is attentive.         Mood and Affect: Mood and affect normal. Mood is not anxious or depressed. Affect is not labile, blunt, angry or inappropriate.         Speech: Speech normal.         Behavior: Behavior normal. Behavior is not actively hallucinating.         Thought Content: Thought content normal.         Cognition and Memory: Cognition and memory normal.         Judgment: Judgment normal.         Assessment:       1. TRAY (generalized anxiety disorder)        Plan:       Diagnoses and all orders for this visit:    TRAY (generalized anxiety disorder)  -     busPIRone (BUSPAR) 10 MG tablet; Take 1 tablet (10 mg total) by mouth 3 (three) times daily.  -     sertraline (ZOLOFT) 100  MG tablet; Take 1 tablet (100 mg total) by mouth once daily.    not controlled  Stop lexapro  Start zoloft daily  Continue buspar tid  Follow up 6 weeks virtual with Dr Mcdonald for mood check

## 2022-03-08 ENCOUNTER — OFFICE VISIT (OUTPATIENT)
Dept: INTERNAL MEDICINE | Facility: CLINIC | Age: 27
End: 2022-03-08
Payer: COMMERCIAL

## 2022-03-08 DIAGNOSIS — J02.9 PHARYNGITIS, UNSPECIFIED ETIOLOGY: ICD-10-CM

## 2022-03-08 DIAGNOSIS — J01.40 ACUTE NON-RECURRENT PANSINUSITIS: Primary | ICD-10-CM

## 2022-03-08 PROCEDURE — 99213 OFFICE O/P EST LOW 20 MIN: CPT | Mod: 95,,, | Performed by: NURSE PRACTITIONER

## 2022-03-08 PROCEDURE — 1159F PR MEDICATION LIST DOCUMENTED IN MEDICAL RECORD: ICD-10-PCS | Mod: CPTII,95,, | Performed by: NURSE PRACTITIONER

## 2022-03-08 PROCEDURE — 1160F PR REVIEW ALL MEDS BY PRESCRIBER/CLIN PHARMACIST DOCUMENTED: ICD-10-PCS | Mod: CPTII,95,, | Performed by: NURSE PRACTITIONER

## 2022-03-08 PROCEDURE — 1159F MED LIST DOCD IN RCRD: CPT | Mod: CPTII,95,, | Performed by: NURSE PRACTITIONER

## 2022-03-08 PROCEDURE — 1160F RVW MEDS BY RX/DR IN RCRD: CPT | Mod: CPTII,95,, | Performed by: NURSE PRACTITIONER

## 2022-03-08 PROCEDURE — 99213 PR OFFICE/OUTPT VISIT, EST, LEVL III, 20-29 MIN: ICD-10-PCS | Mod: 95,,, | Performed by: NURSE PRACTITIONER

## 2022-03-08 RX ORDER — AMOXICILLIN AND CLAVULANATE POTASSIUM 875; 125 MG/1; MG/1
1 TABLET, FILM COATED ORAL 2 TIMES DAILY
Qty: 20 TABLET | Refills: 0 | Status: SHIPPED | OUTPATIENT
Start: 2022-03-08 | End: 2022-03-18

## 2022-03-08 NOTE — PROGRESS NOTES
Subjective:       Patient ID: Chey Chavira is a 26 y.o. female.    Chief Complaint: Sore Throat    The patient location is: home  The chief complaint leading to consultation is: sore throat    Visit type: audiovisual    Face to Face time with patient: 15 minutes  20 minutes of total time spent on the encounter, which includes face to face time and non-face to face time preparing to see the patient (eg, review of tests), Obtaining and/or reviewing separately obtained history, Documenting clinical information in the electronic or other health record, Independently interpreting results (not separately reported) and communicating results to the patient/family/caregiver, or Care coordination (not separately reported).         Each patient to whom he or she provides medical services by telemedicine is:  (1) informed of the relationship between the physician and patient and the respective role of any other health care provider with respect to management of the patient; and (2) notified that he or she may decline to receive medical services by telemedicine and may withdraw from such care at any time.    Notes:   Patient doing virtual for sore throat  Had Negative covid and strep test at work  Has nasal congestion   Feels her lymph nodes in neck are swollen  Does not have tonsils  Throat is red/irritated  Pain 3/10 worse in am   She is taking tylenol, ibuprofen  Mild cough    Sore Throat   This is a recurrent problem. The current episode started 1 to 4 weeks ago. The problem has been waxing and waning. Neither side of throat is experiencing more pain than the other. There has been no fever. The pain is at a severity of 4/10. The pain is mild. Associated symptoms include congestion, coughing, swollen glands and trouble swallowing. Pertinent negatives include no abdominal pain, diarrhea, drooling, ear discharge, ear pain, headaches, hoarse voice, plugged ear sensation, neck pain, shortness of breath, stridor or vomiting.  She has had no exposure to strep or mono. She has tried NSAIDs and acetaminophen for the symptoms. The treatment provided mild relief.       There were no vitals taken for this visit.    Review of Systems   HENT: Positive for congestion, sore throat and trouble swallowing. Negative for drooling, ear discharge, ear pain and hoarse voice.    Respiratory: Positive for cough. Negative for shortness of breath and stridor.    Gastrointestinal: Negative for abdominal pain, diarrhea and vomiting.   Musculoskeletal: Negative for neck pain.   Neurological: Negative for headaches.       Objective:      Physical Exam  Constitutional:       General: She is not in acute distress.     Appearance: She is well-developed. She is not diaphoretic.   HENT:      Head: Normocephalic and atraumatic.      Right Ear: External ear normal.      Left Ear: External ear normal.   Eyes:      General: No scleral icterus.        Right eye: No discharge.         Left eye: No discharge.      Conjunctiva/sclera: Conjunctivae normal.   Pulmonary:      Effort: Pulmonary effort is normal. No tachypnea, accessory muscle usage or respiratory distress.      Breath sounds: No stridor.   Musculoskeletal:      Cervical back: Normal range of motion and neck supple.   Skin:     Findings: No rash.   Neurological:      Mental Status: She is alert. She is not disoriented.   Psychiatric:         Attention and Perception: She is attentive.         Mood and Affect: Mood is not anxious or depressed. Affect is not labile, blunt, angry or inappropriate.         Speech: Speech normal.         Behavior: Behavior normal.         Thought Content: Thought content normal.         Judgment: Judgment normal.         Assessment:       1. Acute non-recurrent pansinusitis    2. Pharyngitis, unspecified etiology        Plan:       Chey was seen today for sore throat.    Diagnoses and all orders for this visit:    Acute non-recurrent pansinusitis  -     amoxicillin-clavulanate  875-125mg (AUGMENTIN) 875-125 mg per tablet; Take 1 tablet by mouth 2 (two) times daily. for 10 days  -     (Magic mouthwash) 1:1:1 diphenhydramine(Benadryl) 12.5mg/5ml liq, aluminum & magnesium hydroxide-simethicone (Maalox), LIDOcaine viscous 2%; Swish and spit 15 mLs every 4 (four) hours as needed.    Pharyngitis, unspecified etiology    continue flonase and zyrtec d  Supportive care discussed  Work note for yesterday and today given  Follow up if not improving/worse    Follow up virtual with me in 2 weeks for mood check

## 2022-03-08 NOTE — LETTER
March 8, 2022      [facility]  [address] [place] [address]  Phone: [phone]  Fax: [phone]       Patient: Chey Chavira   YOB: 1995  Date of Visit: 03/08/2022    To Whom It May Concern:    Raul Chavira  was at [facility] on 03/07/2022- 03/08/2022. The patient may return to work/school on 03/09/2022 With no restrictions. If you have any questions or concerns, or if I can be of further assistance, please do not hesitate to contact me.    Sincerely,    Jaclyn Jasso MA

## 2022-03-29 ENCOUNTER — PATIENT MESSAGE (OUTPATIENT)
Dept: RESEARCH | Facility: HOSPITAL | Age: 27
End: 2022-03-29
Payer: COMMERCIAL

## 2022-03-29 ENCOUNTER — PATIENT MESSAGE (OUTPATIENT)
Dept: ADMINISTRATIVE | Facility: HOSPITAL | Age: 27
End: 2022-03-29
Payer: COMMERCIAL

## 2022-04-04 ENCOUNTER — PATIENT MESSAGE (OUTPATIENT)
Dept: DIABETES | Facility: CLINIC | Age: 27
End: 2022-04-04
Payer: COMMERCIAL

## 2022-04-04 DIAGNOSIS — E10.65 UNCONTROLLED TYPE 1 DIABETES MELLITUS WITH HYPERGLYCEMIA: ICD-10-CM

## 2022-04-04 RX ORDER — INSULIN ASPART 100 [IU]/ML
INJECTION, SOLUTION INTRAVENOUS; SUBCUTANEOUS
Qty: 30 ML | Refills: 1 | Status: SHIPPED | OUTPATIENT
Start: 2022-04-04 | End: 2022-05-30 | Stop reason: SDUPTHER

## 2022-04-06 ENCOUNTER — PATIENT MESSAGE (OUTPATIENT)
Dept: INTERNAL MEDICINE | Facility: CLINIC | Age: 27
End: 2022-04-06
Payer: COMMERCIAL

## 2022-04-19 ENCOUNTER — OFFICE VISIT (OUTPATIENT)
Dept: INTERNAL MEDICINE | Facility: CLINIC | Age: 27
End: 2022-04-19
Payer: COMMERCIAL

## 2022-04-19 ENCOUNTER — PATIENT MESSAGE (OUTPATIENT)
Dept: INTERNAL MEDICINE | Facility: CLINIC | Age: 27
End: 2022-04-19

## 2022-04-19 DIAGNOSIS — J32.9 SINUSITIS, UNSPECIFIED CHRONICITY, UNSPECIFIED LOCATION: Primary | ICD-10-CM

## 2022-04-19 DIAGNOSIS — F41.1 GAD (GENERALIZED ANXIETY DISORDER): ICD-10-CM

## 2022-04-19 DIAGNOSIS — E10.65 UNCONTROLLED TYPE 1 DIABETES MELLITUS WITH HYPERGLYCEMIA: ICD-10-CM

## 2022-04-19 DIAGNOSIS — J40 BRONCHITIS: ICD-10-CM

## 2022-04-19 PROCEDURE — 99214 PR OFFICE/OUTPT VISIT, EST, LEVL IV, 30-39 MIN: ICD-10-PCS | Mod: 95,,, | Performed by: NURSE PRACTITIONER

## 2022-04-19 PROCEDURE — 99214 OFFICE O/P EST MOD 30 MIN: CPT | Mod: 95,,, | Performed by: NURSE PRACTITIONER

## 2022-04-19 RX ORDER — DOXYCYCLINE 100 MG/1
100 CAPSULE ORAL EVERY 12 HOURS
Qty: 20 CAPSULE | Refills: 0 | Status: SHIPPED | OUTPATIENT
Start: 2022-04-19 | End: 2022-04-29

## 2022-04-19 RX ORDER — PROMETHAZINE HYDROCHLORIDE AND DEXTROMETHORPHAN HYDROBROMIDE 6.25; 15 MG/5ML; MG/5ML
5 SYRUP ORAL NIGHTLY PRN
Qty: 120 ML | Refills: 0 | Status: SHIPPED | OUTPATIENT
Start: 2022-04-19 | End: 2023-09-15

## 2022-04-19 RX ORDER — BENZONATATE 200 MG/1
200 CAPSULE ORAL 3 TIMES DAILY PRN
Qty: 30 CAPSULE | Refills: 0 | Status: SHIPPED | OUTPATIENT
Start: 2022-04-19 | End: 2022-04-29

## 2022-04-19 RX ORDER — BUSPIRONE HYDROCHLORIDE 10 MG/1
10 TABLET ORAL 3 TIMES DAILY
Qty: 270 TABLET | Refills: 1 | Status: SHIPPED | OUTPATIENT
Start: 2022-04-19 | End: 2022-11-04 | Stop reason: SDUPTHER

## 2022-04-19 RX ORDER — SERTRALINE HYDROCHLORIDE 100 MG/1
100 TABLET, FILM COATED ORAL DAILY
Qty: 90 TABLET | Refills: 1 | Status: SHIPPED | OUTPATIENT
Start: 2022-04-19 | End: 2022-11-04 | Stop reason: SDUPTHER

## 2022-04-19 RX ORDER — ALBUTEROL SULFATE 90 UG/1
2 AEROSOL, METERED RESPIRATORY (INHALATION) EVERY 4 HOURS PRN
Qty: 8 G | Refills: 1 | Status: SHIPPED | OUTPATIENT
Start: 2022-04-19 | End: 2022-11-04 | Stop reason: SDUPTHER

## 2022-04-19 NOTE — PROGRESS NOTES
Subjective:       Patient ID: Chey Chavira is a 26 y.o. female.    Chief Complaint: URI    The patient location is: home  The chief complaint leading to consultation is: cough    Visit type: audiovisual    Face to Face time with patient: 15 min  20 minutes of total time spent on the encounter, which includes face to face time and non-face to face time preparing to see the patient (eg, review of tests), Obtaining and/or reviewing separately obtained history, Documenting clinical information in the electronic or other health record, Independently interpreting results (not separately reported) and communicating results to the patient/family/caregiver, or Care coordination (not separately reported).         Each patient to whom he or she provides medical services by telemedicine is:  (1) informed of the relationship between the physician and patient and the respective role of any other health care provider with respect to management of the patient; and (2) notified that he or she may decline to receive medical services by telemedicine and may withdraw from such care at any time.    Notes:   Patient doing virtual visit for cough  Also has nausea and vomiting, taking zofran  Mucous is thick, green with blood  Cough is keeping patient up at night  She was initially treated for sinus infection with augmentin in early march  Symptoms are still lingering but not in chest  No hx of asthma- no wheeze  Nasal congestion and post nasal drip continue, has frequent throat clearing  No sinus pain/pressure  Mood stable on zoloft and buspar  Works at [employer]   Has dm1, uncontrolled, due for a1c    URI   Associated symptoms include congestion, coughing, rhinorrhea and a sore throat. Pertinent negatives include no chest pain, ear pain, headaches, rash or wheezing.   Cough  This is a chronic problem. The current episode started 1 to 4 weeks ago. The problem has been gradually worsening. The problem occurs constantly. The  cough is productive of blood-tinged sputum and productive of purulent sputum. Associated symptoms include hemoptysis, nasal congestion, postnasal drip, rhinorrhea, a sore throat and sweats. Pertinent negatives include no chest pain, chills, ear congestion, ear pain, fever, headaches, heartburn, myalgias, rash, shortness of breath, weight loss or wheezing. The symptoms are aggravated by lying down and pollens. She has tried OTC cough suppressant, body position changes, cool air, prescription cough suppressant and rest for the symptoms. The treatment provided mild relief. There is no history of asthma, bronchiectasis, bronchitis, COPD, emphysema, environmental allergies or pneumonia.       There were no vitals taken for this visit.    Review of Systems   Constitutional: Positive for activity change. Negative for appetite change, chills, fever and weight loss.   HENT: Positive for congestion, postnasal drip, rhinorrhea and sore throat. Negative for ear pain.    Respiratory: Positive for cough and hemoptysis. Negative for shortness of breath and wheezing.    Cardiovascular: Negative for chest pain.   Gastrointestinal: Negative for heartburn.   Musculoskeletal: Negative for myalgias.   Skin: Negative for rash.   Allergic/Immunologic: Negative for environmental allergies.   Neurological: Negative for headaches.       Objective:      Physical Exam  Constitutional:       General: She is not in acute distress.     Appearance: She is well-developed. She is not diaphoretic.   HENT:      Head: Normocephalic and atraumatic.      Right Ear: External ear normal.      Left Ear: External ear normal.   Eyes:      General: No scleral icterus.        Right eye: No discharge.         Left eye: No discharge.      Conjunctiva/sclera: Conjunctivae normal.   Pulmonary:      Effort: Pulmonary effort is normal. No tachypnea, accessory muscle usage or respiratory distress.      Breath sounds: No stridor.   Musculoskeletal:      Cervical back:  Normal range of motion and neck supple.   Skin:     Findings: No rash.   Neurological:      Mental Status: She is alert. She is not disoriented.   Psychiatric:         Attention and Perception: She is attentive.         Mood and Affect: Mood is not anxious or depressed. Affect is not labile, blunt, angry or inappropriate.         Speech: Speech normal.         Behavior: Behavior normal.         Thought Content: Thought content normal.         Judgment: Judgment normal.         Assessment:       1. Sinusitis, unspecified chronicity, unspecified location    2. Bronchitis    3. Uncontrolled type 1 diabetes mellitus with hyperglycemia    4. TRAY (generalized anxiety disorder)        Plan:       Chey was seen today for uri.    Diagnoses and all orders for this visit:    Sinusitis, unspecified chronicity, unspecified location  -     doxycycline (VIBRAMYCIN) 100 MG Cap; Take 1 capsule (100 mg total) by mouth every 12 (twelve) hours. for 10 days  Symptoms ongoing x 4 weeks, now with chest congestion, thick green mucous  Add 12 hour mucinex with lots of fluids, get cxr as well due to dm1 with cough x 4 weeks    Bronchitis  -     promethazine-dextromethorphan (PROMETHAZINE-DM) 6.25-15 mg/5 mL Syrp; Take 5 mLs by mouth nightly as needed (Cough).  -     X-Ray Chest PA And Lateral; Future  -     benzonatate (TESSALON) 200 MG capsule; Take 1 capsule (200 mg total) by mouth 3 (three) times daily as needed for Cough.  -     albuterol (PROVENTIL/VENTOLIN HFA) 90 mcg/actuation inhaler; Inhale 2 puffs into the lungs every 4 (four) hours as needed for Wheezing or Shortness of Breath.    Uncontrolled type 1 diabetes mellitus with hyperglycemia  Uncontrolled, on insulin, draw a1c    TRAY (generalized anxiety disorder)  -     busPIRone (BUSPAR) 10 MG tablet; Take 1 tablet (10 mg total) by mouth 3 (three) times daily.  -     sertraline (ZOLOFT) 100 MG tablet; Take 1 tablet (100 mg total) by mouth once daily.  Stable on zoloft and buspar

## 2022-04-26 ENCOUNTER — PATIENT MESSAGE (OUTPATIENT)
Dept: ADMINISTRATIVE | Facility: HOSPITAL | Age: 27
End: 2022-04-26
Payer: COMMERCIAL

## 2022-05-01 LAB
ALBUMIN SERPL BCP-MCNC: 3 G/DL (ref 3.5–5)
ALP SERPL-CCNC: 175 U/L (ref 40–150)
ALT SERPL W P-5'-P-CCNC: 31 U/L (ref 5–50)
AST SERPL-CCNC: 54 U/L (ref 10–58)
BILIRUB SERPL-MCNC: 0.9 MG/DL (ref 0.2–1.2)
BUN SERPL-MCNC: 8 MG/DL (ref 5–25)
CALCIUM SERPL-MCNC: 8.6 MG/DL (ref 8.8–10.6)
CHLORIDE SERPL-SCNC: 105 MMOL/L (ref 100–109)
CO2 SERPL-SCNC: 19 MMOL/L (ref 22–33)
CREAT SERPL-MCNC: 0.7 MG/DL (ref 0.6–1.3)
EST. GFR  (NO RACE VARIABLE): 125
GLUCOSE SERPL-MCNC: 147 MG/DL (ref 70–100)
POTASSIUM SERPL-SCNC: 4 MMOL/L (ref 3.5–5.1)
PROT SERPL-MCNC: 7.6 G/DL (ref 6–8.3)
SODIUM BLD-SCNC: 141 MMOL/L (ref 136–145)

## 2022-05-26 ENCOUNTER — PATIENT MESSAGE (OUTPATIENT)
Dept: INTERNAL MEDICINE | Facility: CLINIC | Age: 27
End: 2022-05-26
Payer: COMMERCIAL

## 2022-05-30 ENCOUNTER — PATIENT MESSAGE (OUTPATIENT)
Dept: DIABETES | Facility: CLINIC | Age: 27
End: 2022-05-30
Payer: COMMERCIAL

## 2022-06-10 ENCOUNTER — LAB VISIT (OUTPATIENT)
Dept: LAB | Facility: HOSPITAL | Age: 27
End: 2022-06-10
Attending: FAMILY MEDICINE
Payer: COMMERCIAL

## 2022-06-10 ENCOUNTER — OFFICE VISIT (OUTPATIENT)
Dept: DIABETES | Facility: CLINIC | Age: 27
End: 2022-06-10
Payer: COMMERCIAL

## 2022-06-10 VITALS
WEIGHT: 227.06 LBS | HEIGHT: 65 IN | HEART RATE: 109 BPM | DIASTOLIC BLOOD PRESSURE: 73 MMHG | SYSTOLIC BLOOD PRESSURE: 117 MMHG | BODY MASS INDEX: 37.83 KG/M2

## 2022-06-10 DIAGNOSIS — E10.65 UNCONTROLLED TYPE 1 DIABETES MELLITUS WITH HYPERGLYCEMIA: Primary | ICD-10-CM

## 2022-06-10 DIAGNOSIS — F41.1 GAD (GENERALIZED ANXIETY DISORDER): ICD-10-CM

## 2022-06-10 DIAGNOSIS — E10.65 UNCONTROLLED TYPE 1 DIABETES MELLITUS WITH HYPERGLYCEMIA: ICD-10-CM

## 2022-06-10 LAB
ESTIMATED AVG GLUCOSE: 289 MG/DL (ref 68–131)
HBA1C MFR BLD: 11.7 % (ref 4–5.6)

## 2022-06-10 PROCEDURE — 1159F PR MEDICATION LIST DOCUMENTED IN MEDICAL RECORD: ICD-10-PCS | Mod: CPTII,S$GLB,, | Performed by: NURSE PRACTITIONER

## 2022-06-10 PROCEDURE — 1160F PR REVIEW ALL MEDS BY PRESCRIBER/CLIN PHARMACIST DOCUMENTED: ICD-10-PCS | Mod: CPTII,S$GLB,, | Performed by: NURSE PRACTITIONER

## 2022-06-10 PROCEDURE — 99999 PR PBB SHADOW E&M-EST. PATIENT-LVL V: CPT | Mod: PBBFAC,,, | Performed by: NURSE PRACTITIONER

## 2022-06-10 PROCEDURE — 99215 PR OFFICE/OUTPT VISIT, EST, LEVL V, 40-54 MIN: ICD-10-PCS | Mod: S$GLB,,, | Performed by: NURSE PRACTITIONER

## 2022-06-10 PROCEDURE — 99999 PR PBB SHADOW E&M-EST. PATIENT-LVL V: ICD-10-PCS | Mod: PBBFAC,,, | Performed by: NURSE PRACTITIONER

## 2022-06-10 PROCEDURE — 3008F PR BODY MASS INDEX (BMI) DOCUMENTED: ICD-10-PCS | Mod: CPTII,S$GLB,, | Performed by: NURSE PRACTITIONER

## 2022-06-10 PROCEDURE — 3078F DIAST BP <80 MM HG: CPT | Mod: CPTII,S$GLB,, | Performed by: NURSE PRACTITIONER

## 2022-06-10 PROCEDURE — 3008F BODY MASS INDEX DOCD: CPT | Mod: CPTII,S$GLB,, | Performed by: NURSE PRACTITIONER

## 2022-06-10 PROCEDURE — 3078F PR MOST RECENT DIASTOLIC BLOOD PRESSURE < 80 MM HG: ICD-10-PCS | Mod: CPTII,S$GLB,, | Performed by: NURSE PRACTITIONER

## 2022-06-10 PROCEDURE — 83036 HEMOGLOBIN GLYCOSYLATED A1C: CPT | Performed by: NURSE PRACTITIONER

## 2022-06-10 PROCEDURE — 36415 COLL VENOUS BLD VENIPUNCTURE: CPT | Performed by: NURSE PRACTITIONER

## 2022-06-10 PROCEDURE — 1160F RVW MEDS BY RX/DR IN RCRD: CPT | Mod: CPTII,S$GLB,, | Performed by: NURSE PRACTITIONER

## 2022-06-10 PROCEDURE — 1159F MED LIST DOCD IN RCRD: CPT | Mod: CPTII,S$GLB,, | Performed by: NURSE PRACTITIONER

## 2022-06-10 PROCEDURE — 3074F SYST BP LT 130 MM HG: CPT | Mod: CPTII,S$GLB,, | Performed by: NURSE PRACTITIONER

## 2022-06-10 PROCEDURE — 3074F PR MOST RECENT SYSTOLIC BLOOD PRESSURE < 130 MM HG: ICD-10-PCS | Mod: CPTII,S$GLB,, | Performed by: NURSE PRACTITIONER

## 2022-06-10 PROCEDURE — 99215 OFFICE O/P EST HI 40 MIN: CPT | Mod: S$GLB,,, | Performed by: NURSE PRACTITIONER

## 2022-06-10 RX ORDER — LANCETS
1 EACH MISCELLANEOUS
Qty: 100 EACH | Refills: 11 | Status: SHIPPED | OUTPATIENT
Start: 2022-06-10

## 2022-06-10 RX ORDER — SYRINGE-NEEDLE,INSULIN,0.5 ML 30 GX5/16"
1 SYRINGE, EMPTY DISPOSABLE MISCELLANEOUS
Qty: 100 EACH | Refills: 11 | Status: SHIPPED | OUTPATIENT
Start: 2022-06-10

## 2022-06-10 RX ORDER — INSULIN DEGLUDEC 200 U/ML
42 INJECTION, SOLUTION SUBCUTANEOUS DAILY
Qty: 3 PEN | Refills: 2 | Status: SHIPPED | OUTPATIENT
Start: 2022-06-10 | End: 2022-12-08

## 2022-06-10 RX ORDER — INSULIN ASPART 100 [IU]/ML
INJECTION, SOLUTION INTRAVENOUS; SUBCUTANEOUS
Qty: 30 ML | Refills: 2 | Status: SHIPPED | OUTPATIENT
Start: 2022-06-10 | End: 2022-12-08

## 2022-06-10 NOTE — PATIENT INSTRUCTIONS
Medication Regimen/Changes:   - Increase Tresiba to 42 units every morning. If fasting blood glucose greater than 130 in the next 3 consecutive days, increase to 46 units every morning.  - Continue Novolog with carb ratio 1:5, correction factor 20 with goal blood sugar 120    Patient Instructions:  Carbohydrate Count: 30-45 grams/meal and 15 grams/snack with limit of 2 snacks per day.  Exercise: Goal is 150 minutes or more per week.  Bring meter and blood sugar log to each appointment.     Goals for Blood Sugar:  Fastin-130 mg/dl  2 hours after meal:  mg/dl  Before Bed: 100-150 mg/dl  If Blood sugar is below 70 mg/dl, DO NOT take diabetes medication. Eat first and then recheck blood sugar in 20 minutes.  Foods to eat if blood sugar is below 70 mg/dl  1/2 glass of orange juice   1/2 regular cola can   3-4 hard candies   3-4 small glucose tabs.   Foods to eat if blood sugar is below 50 mg/dl  1 glass of orange juice  1 regular cola can  8 hard candies  8 small glucose tabs.   If you have repeated eating/blood sugar recheck process 2 times and blood sugar still below 70 mg/dl, call 911.

## 2022-06-10 NOTE — PROGRESS NOTES
PCP: Miko Mcdonald MD    Subjective:     Chief Complaint: Diabetes Consult    HPI: 26 y.o.  female for diabetes consult.   Previously managed by SISI Barahona PA-C.  Last clinic visit 3/3/2021  Type I diabetes since age 13.    Comorbidities: Obesity by BMI, TRAY.    Previously on Medtronic pump.  Had trouble with frequent tubing kinks.  Been off the pump for 1 year.  Verbalized wanting to switch to Tandem pump.    Now doing basal bolus regimen.    Previously on CGM Dexcom also.  Able to use a sample Dexcom previously.    Family History of Type 1/2 DM: yes, father  Known diabetes complications:  DKA/HHS: yes in 2018 & 2019  Retinopathy: no. [facility] last month  Peripheral neuropathy: no  Nephropathy: no    Denies recent hospital admissions or ER visits.   Denies having hypoglycemia.   Endorses diabetes related symptoms: None.    Blood glucose monitoring via fingerstick: 2-4 x/day   Per patient's recall, over the last 2  weeks   AC lunch 140-280 average 180-190   Bedtime 140-280    Activity: Sedentary- none  Diet:3 meals/day;1 snacks/day.     Medication compliance all of the time, diet compliance most of the time.   Has attended diabetes education in the past.     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  Will not be back in town for another 2 months.    Previous regimen: Novolog via Medtronic    Past failed treatment: N/A    Current DM Medications:   - Tresiba 38 units qam   - Novolog ICR 5, CF 20 Goal      Allergies  Review of patient's allergies indicates:  No Known Allergies      Labs Reviewed:  Her most recent A1C is:  Lab Results   Component Value Date    HGBA1C 10.4 (H) 06/01/2021    HGBA1C 11.2 (H) 10/01/2020     Her most recent BMP is:  Lab Results   Component Value Date     10/01/2020    K 4.1 10/01/2020     10/01/2020    CO2 27 10/01/2020    BUN 5 (L) 10/01/2020    CREATININE 0.7 10/01/2020    CALCIUM 8.9 10/01/2020    ANIONGAP 8  10/01/2020    ESTGFRAFRICA >60.0 10/01/2020    EGFRNONAA >60.0 10/01/2020       No results found for: CPEPTIDE  No results found for: GLUTAMICACID    Body mass index is 37.79 kg/m².    Wt Readings from Last 3 Encounters:   06/10/22 1326 103 kg (227 lb 1.2 oz)   02/09/22 1416 107.5 kg (236 lb 15.9 oz)   01/26/22 1035 106.6 kg (235 lb)        Her blood sugar in clinic today is: 282    No results found for: POCGLU    Diabetes Management Status  Statin: Not taking  ACE/ARB: Not taking    Screening or Prevention Patient's value Goal Complete/Controlled?   HgA1C Testing and Control   Lab Results   Component Value Date    HGBA1C 10.4 (H) 06/01/2021      Annually/Less than 8% No   Lipid profile : 06/01/2021 Annually No   LDL control Lab Results   Component Value Date    LDLCALC 119.6 06/01/2021    Annually/Less than 100 mg/dl  No   Nephropathy screening Lab Results   Component Value Date    MICALBCREAT 385.5 (H) 10/08/2020     No results found for: PROTEINUA Annually No   Blood pressure BP Readings from Last 1 Encounters:   06/10/22 117/73    Less than 140/90 Yes   Dilated retinal exam : 09/29/2020 Annually Yes    Foot exam   Most Recent Foot Exam Date: Not Found Annually No     Social History     Socioeconomic History    Marital status:    Tobacco Use    Smoking status: Never Smoker    Smokeless tobacco: Never Used   Substance and Sexual Activity    Alcohol use: Never    Drug use: Never    Sexual activity: Yes     Partners: Male     Birth control/protection: None     Past Medical History:   Diagnosis Date    Anxiety     Depression     Diabetes mellitus     Diabetes mellitus type I     Hematochezia 7/16/2021    Hypercholesteremia     Uncontrolled type 1 diabetes mellitus with hyperglycemia 12/15/2020       Review of Systems   Constitutional: Negative for activity change, appetite change, fatigue and unexpected weight change.   HENT: Negative for dental problem and trouble swallowing.    Eyes: Negative  for visual disturbance.   Respiratory: Negative for chest tightness and shortness of breath.    Cardiovascular: Negative for chest pain, palpitations and leg swelling.   Gastrointestinal: Negative for abdominal pain, constipation, diarrhea, nausea and vomiting.   Endocrine: Negative for polydipsia, polyphagia and polyuria.   Genitourinary: Negative for difficulty urinating, dysuria, frequency and urgency.        Negative yeast infection   Musculoskeletal: Negative for gait problem, myalgias and neck pain.   Integumentary:  Negative for rash and wound.   Allergic/Immunologic: Negative.    Neurological: Negative for dizziness, syncope, weakness, numbness and headaches.   Hematological: Negative.    Psychiatric/Behavioral: Negative for confusion and sleep disturbance.   All other systems reviewed and are negative.       Objective:      Physical Exam  Vitals reviewed.   Constitutional:       Appearance: Normal appearance.   HENT:      Head: Normocephalic and atraumatic.   Eyes:      General: Vision grossly intact.      Extraocular Movements: Extraocular movements intact.      Pupils: Pupils are equal, round, and reactive to light.   Neck:      Thyroid: No thyroid mass or thyroid tenderness.   Cardiovascular:      Rate and Rhythm: Normal rate and regular rhythm.      Pulses: Normal pulses.           Radial pulses are 2+ on the right side and 2+ on the left side.        Dorsalis pedis pulses are 2+ on the right side and 2+ on the left side.      Heart sounds: Normal heart sounds.   Pulmonary:      Effort: Pulmonary effort is normal.      Breath sounds: Normal breath sounds.   Abdominal:      General: Bowel sounds are normal.      Palpations: Abdomen is soft.   Musculoskeletal:         General: Normal range of motion.      Cervical back: Normal range of motion and neck supple.      Right lower leg: No edema.      Left lower leg: No edema.      Right foot: No deformity or bunion.      Left foot: No deformity or bunion.    Feet:      Right foot:      Protective Sensation: 6 sites tested. 6 sites sensed.      Skin integrity: Skin integrity normal. No ulcer, skin breakdown, callus or dry skin.      Toenail Condition: Right toenails are normal.      Left foot:      Protective Sensation: 6 sites tested. 6 sites sensed.      Skin integrity: Skin integrity normal. No ulcer, skin breakdown, callus or dry skin.      Toenail Condition: Left toenails are normal.      Comments: Pinprick exam completed with 10-g monofilament. Vibration sensation intact.  Lymphadenopathy:      Cervical: No cervical adenopathy.   Skin:     General: Skin is warm and dry.      Findings: No rash or wound.      Comments: Positive for acanthosis nigricans. Well-healed SQ injection sites.   Neurological:      Mental Status: She is alert and oriented to person, place, and time.      Coordination: Coordination is intact.      Gait: Gait is intact.   Psychiatric:         Attention and Perception: Attention normal.         Mood and Affect: Mood normal.         Speech: Speech normal.         Behavior: Behavior normal. Behavior is cooperative.         Thought Content: Thought content normal.         Cognition and Memory: Cognition normal.         Assessment / Plan:     Uncontrolled type 1 diabetes mellitus with hyperglycemia  -     Hemoglobin A1C; Standing  -     blood sugar diagnostic (CONTOUR NEXT TEST STRIPS) Strp; Use 1 strip 4 (four) times daily.  Dispense: 100 each; Refill: 11  -     insulin syringe-needle U-100 1 mL 30 gauge x 5/16 Syrg; 1 each by Misc.(Non-Drug; Combo Route) route 6 (six) times daily.  Dispense: 100 each; Refill: 11  -     lancets Misc; 1 each by Misc.(Non-Drug; Combo Route) route 6 (six) times daily.  Dispense: 100 each; Refill: 11  -     Ambulatory referral/consult to Diabetes Education; Future; Expected date: 06/17/2022  -     insulin degludec (TRESIBA FLEXTOUCH U-200) 200 unit/mL (3 mL) insulin pen; Inject 42 Units into the skin once daily.   Dispense: 3 pen; Refill: 2  -     insulin aspart U-100 (NOVOLOG) 100 unit/mL injection; Inject as following: IC 7, ISF 28, and Goal . Titrate up to 100 units daily.  Dispense: 30 mL; Refill: 2  -     POCT Glucose, Hand-Held Device    BMI 37.0-37.9, adult    TRAY (generalized anxiety disorder)    Additional Plan Details:  - Condition: uncontrolled, most recent A1C of  10.4% was completed 1 year ago.   - Monitor blood glucose:  4-6x daily.Goals reviewed. Maintain BG log.  Patient would benefit from CGM use.  - CGM note: Patient is testing 4 times per day. Patient is injecting meal time insulin 3 times daily and is self adjusting insulin based on blood glucose reading and carbohydrate ratio. Patient requires CGM for blood sugar monitoring due to frequent insulin dose changes.  - Hypoglycemia protocol: Reviewed and risk discussed.  Notify if BG readings below 80. Protocol printout provided.   - Diet: Limit carbohydrate intake 30-45 grams/meal, 3x daily and 15 grams/snack , limit of two daily.   - Activity: Recommend at least 150 minutes of exercise in a week.  - BP and LDL: Recommend lifestyle modifications and follow up with PCP for management.   - Medication Changes:    - Increase Tresiba to 42 units every morning. If fasting blood glucose greater than 130 in the next 3  consecutive days, increase to 46 units every morning.   - Continue Novolog with carb ratio 1:5, correction factor 20 with goal blood sugar 120    - Medication consideration:  Titrate basal/bolus to goal blood sugar.  Plan to transition to a tandem pump when able.  - Lab results: A1C discussed.  - Health Maintenance standards addressed today: Foot Exam - completed today and documented in physical exam with feedback to patient about proper diabetic foot care and findings, Diabetic Care Specialist appointment to be scheduled today, A1c to be scheduled and Eye exam up-to-date..   - Risks of uncontrolled DM explained. Importance of routine foot and  eye exams reviewed. Scheduled as appropriate.  -The patient was explained the above plan and given opportunity to ask questions.  She understands, chooses and consents to this plan and accepts all the risks, which include but are not limited to the risks mentioned above.   - Labs ordered as above. A1c today. Plan to do Cpeptide and TRAY when able.  - CDE follow up:  2 months  - Follow up:  2 months.        Charlotte T. Delacruz, FNP-C [facility]    A total of 60 minutes was spent in face to face time, of which over 50 % was spent in counseling patient on disease process, complications, treatment, and side effects of medications.

## 2022-06-29 ENCOUNTER — PATIENT MESSAGE (OUTPATIENT)
Dept: DIABETES | Facility: CLINIC | Age: 27
End: 2022-06-29
Payer: COMMERCIAL

## 2022-09-16 ENCOUNTER — PATIENT OUTREACH (OUTPATIENT)
Dept: ADMINISTRATIVE | Facility: HOSPITAL | Age: 27
End: 2022-09-16
Payer: COMMERCIAL

## 2022-09-16 DIAGNOSIS — E10.65 UNCONTROLLED TYPE 1 DIABETES MELLITUS WITH HYPERGLYCEMIA: Primary | ICD-10-CM

## 2022-09-16 DIAGNOSIS — E10.65 TYPE 1 DIABETES MELLITUS WITH HYPERGLYCEMIA: ICD-10-CM

## 2022-09-16 NOTE — PROGRESS NOTES
BR eGFR Report.    CMP abstracted/entered from Care Everywhere.    Assisted to schedule annual exam, 9/22/22 with labs prior on 9/20/22.

## 2022-10-18 ENCOUNTER — PATIENT MESSAGE (OUTPATIENT)
Dept: ADMINISTRATIVE | Facility: HOSPITAL | Age: 27
End: 2022-10-18
Payer: COMMERCIAL

## 2022-11-04 ENCOUNTER — OFFICE VISIT (OUTPATIENT)
Dept: INTERNAL MEDICINE | Facility: CLINIC | Age: 27
End: 2022-11-04
Payer: COMMERCIAL

## 2022-11-04 VITALS
SYSTOLIC BLOOD PRESSURE: 112 MMHG | DIASTOLIC BLOOD PRESSURE: 74 MMHG | BODY MASS INDEX: 37.46 KG/M2 | WEIGHT: 218.25 LBS | HEART RATE: 90 BPM | OXYGEN SATURATION: 99 % | TEMPERATURE: 98 F

## 2022-11-04 DIAGNOSIS — E10.65 UNCONTROLLED TYPE 1 DIABETES MELLITUS WITH HYPERGLYCEMIA: Primary | ICD-10-CM

## 2022-11-04 DIAGNOSIS — J40 BRONCHITIS: ICD-10-CM

## 2022-11-04 DIAGNOSIS — F41.1 GAD (GENERALIZED ANXIETY DISORDER): ICD-10-CM

## 2022-11-04 DIAGNOSIS — R51.9 NONINTRACTABLE HEADACHE, UNSPECIFIED CHRONICITY PATTERN, UNSPECIFIED HEADACHE TYPE: ICD-10-CM

## 2022-11-04 DIAGNOSIS — J06.9 VIRAL URI WITH COUGH: ICD-10-CM

## 2022-11-04 DIAGNOSIS — K62.5 RECTAL BLEEDING: ICD-10-CM

## 2022-11-04 PROCEDURE — 90472 IMMUNIZATION ADMIN EACH ADD: CPT | Mod: S$GLB,,, | Performed by: FAMILY MEDICINE

## 2022-11-04 PROCEDURE — 3074F SYST BP LT 130 MM HG: CPT | Mod: CPTII,S$GLB,, | Performed by: FAMILY MEDICINE

## 2022-11-04 PROCEDURE — 3008F PR BODY MASS INDEX (BMI) DOCUMENTED: ICD-10-PCS | Mod: CPTII,S$GLB,, | Performed by: FAMILY MEDICINE

## 2022-11-04 PROCEDURE — 99999 PR PBB SHADOW E&M-EST. PATIENT-LVL V: ICD-10-PCS | Mod: PBBFAC,,, | Performed by: FAMILY MEDICINE

## 2022-11-04 PROCEDURE — 1159F PR MEDICATION LIST DOCUMENTED IN MEDICAL RECORD: ICD-10-PCS | Mod: CPTII,S$GLB,, | Performed by: FAMILY MEDICINE

## 2022-11-04 PROCEDURE — 3078F PR MOST RECENT DIASTOLIC BLOOD PRESSURE < 80 MM HG: ICD-10-PCS | Mod: CPTII,S$GLB,, | Performed by: FAMILY MEDICINE

## 2022-11-04 PROCEDURE — 3078F DIAST BP <80 MM HG: CPT | Mod: CPTII,S$GLB,, | Performed by: FAMILY MEDICINE

## 2022-11-04 PROCEDURE — 99999 PR PBB SHADOW E&M-EST. PATIENT-LVL V: CPT | Mod: PBBFAC,,, | Performed by: FAMILY MEDICINE

## 2022-11-04 PROCEDURE — 99214 OFFICE O/P EST MOD 30 MIN: CPT | Mod: 25,S$GLB,, | Performed by: FAMILY MEDICINE

## 2022-11-04 PROCEDURE — 90686 FLU VACCINE (QUAD) GREATER THAN OR EQUAL TO 3YO PRESERVATIVE FREE IM: ICD-10-PCS | Mod: S$GLB,,, | Performed by: FAMILY MEDICINE

## 2022-11-04 PROCEDURE — 90472 PNEUMOCOCCAL CONJUGATE VACCINE 20-VALENT: ICD-10-PCS | Mod: S$GLB,,, | Performed by: FAMILY MEDICINE

## 2022-11-04 PROCEDURE — 90686 IIV4 VACC NO PRSV 0.5 ML IM: CPT | Mod: S$GLB,,, | Performed by: FAMILY MEDICINE

## 2022-11-04 PROCEDURE — 99214 PR OFFICE/OUTPT VISIT, EST, LEVL IV, 30-39 MIN: ICD-10-PCS | Mod: 25,S$GLB,, | Performed by: FAMILY MEDICINE

## 2022-11-04 PROCEDURE — 3008F BODY MASS INDEX DOCD: CPT | Mod: CPTII,S$GLB,, | Performed by: FAMILY MEDICINE

## 2022-11-04 PROCEDURE — 90677 PCV20 VACCINE IM: CPT | Mod: S$GLB,,, | Performed by: FAMILY MEDICINE

## 2022-11-04 PROCEDURE — 3046F HEMOGLOBIN A1C LEVEL >9.0%: CPT | Mod: CPTII,S$GLB,, | Performed by: FAMILY MEDICINE

## 2022-11-04 PROCEDURE — 3074F PR MOST RECENT SYSTOLIC BLOOD PRESSURE < 130 MM HG: ICD-10-PCS | Mod: CPTII,S$GLB,, | Performed by: FAMILY MEDICINE

## 2022-11-04 PROCEDURE — 90471 FLU VACCINE (QUAD) GREATER THAN OR EQUAL TO 3YO PRESERVATIVE FREE IM: ICD-10-PCS | Mod: S$GLB,,, | Performed by: FAMILY MEDICINE

## 2022-11-04 PROCEDURE — 1159F MED LIST DOCD IN RCRD: CPT | Mod: CPTII,S$GLB,, | Performed by: FAMILY MEDICINE

## 2022-11-04 PROCEDURE — 3046F PR MOST RECENT HEMOGLOBIN A1C LEVEL > 9.0%: ICD-10-PCS | Mod: CPTII,S$GLB,, | Performed by: FAMILY MEDICINE

## 2022-11-04 PROCEDURE — 90677 PNEUMOCOCCAL CONJUGATE VACCINE 20-VALENT: ICD-10-PCS | Mod: S$GLB,,, | Performed by: FAMILY MEDICINE

## 2022-11-04 PROCEDURE — 90471 IMMUNIZATION ADMIN: CPT | Mod: S$GLB,,, | Performed by: FAMILY MEDICINE

## 2022-11-04 RX ORDER — FLUTICASONE PROPIONATE 50 MCG
SPRAY, SUSPENSION (ML) NASAL
Qty: 16 G | Refills: 3 | Status: CANCELLED | OUTPATIENT
Start: 2022-11-04

## 2022-11-04 RX ORDER — SERTRALINE HYDROCHLORIDE 100 MG/1
100 TABLET, FILM COATED ORAL DAILY
Qty: 30 TABLET | Refills: 0 | Status: SHIPPED | OUTPATIENT
Start: 2022-11-04 | End: 2023-01-10 | Stop reason: SDUPTHER

## 2022-11-04 RX ORDER — BUTALBITAL, ACETAMINOPHEN AND CAFFEINE 50; 325; 40 MG/1; MG/1; MG/1
1 TABLET ORAL EVERY 4 HOURS PRN
Qty: 30 TABLET | Refills: 0 | Status: SHIPPED | OUTPATIENT
Start: 2022-11-04 | End: 2023-01-10 | Stop reason: SDUPTHER

## 2022-11-04 RX ORDER — ONDANSETRON 4 MG/1
8 TABLET, ORALLY DISINTEGRATING ORAL 2 TIMES DAILY
Qty: 30 TABLET | Refills: 0 | Status: CANCELLED | OUTPATIENT
Start: 2022-11-04

## 2022-11-04 RX ORDER — BUSPIRONE HYDROCHLORIDE 10 MG/1
10 TABLET ORAL 3 TIMES DAILY
Qty: 90 TABLET | Refills: 0 | Status: SHIPPED | OUTPATIENT
Start: 2022-11-04

## 2022-11-04 RX ORDER — PROMETHAZINE HYDROCHLORIDE 25 MG/1
25-50 TABLET ORAL EVERY 8 HOURS PRN
Status: CANCELLED | OUTPATIENT
Start: 2022-11-04

## 2022-11-04 RX ORDER — ALBUTEROL SULFATE 90 UG/1
2 AEROSOL, METERED RESPIRATORY (INHALATION) EVERY 4 HOURS PRN
Qty: 8 G | Refills: 1 | Status: SHIPPED | OUTPATIENT
Start: 2022-11-04

## 2022-11-04 NOTE — PROGRESS NOTES
Subjective:       Patient ID: Chey Chavira is a 26 y.o. female.    Chief Complaint: Rectal Bleeding    BRBPR, Blood clots in stool with weight loss without trying    Rectal Bleeding  This is a recurrent problem. The current episode started more than 1 year ago. The problem occurs constantly. Pertinent negatives include no abdominal pain or chest pain.   Review of Systems   Respiratory:  Negative for shortness of breath.    Cardiovascular:  Negative for chest pain.   Gastrointestinal:  Positive for blood in stool and hematochezia. Negative for abdominal pain.     Objective:      Physical Exam  Vitals and nursing note reviewed.   Constitutional:       General: She is not in acute distress.     Appearance: Normal appearance. She is well-developed. She is not diaphoretic.   HENT:      Head: Normocephalic and atraumatic.   Pulmonary:      Effort: Pulmonary effort is normal. No respiratory distress.      Breath sounds: Normal breath sounds. No wheezing.   Skin:     General: Skin is warm and dry.      Findings: No erythema or rash.   Neurological:      Mental Status: She is alert.       Assessment:       1. Uncontrolled type 1 diabetes mellitus with hyperglycemia    2. TRAY (generalized anxiety disorder)    3. Bronchitis    4. Nonintractable headache, unspecified chronicity pattern, unspecified headache type    5. Viral URI with cough    6. Rectal bleeding          Plan:     Problem List Items Addressed This Visit          Neuro    Nonintractable headache    Relevant Medications    butalbital-acetaminophen-caffeine -40 mg (FIORICET, ESGIC) -40 mg per tablet       Psychiatric    TRAY (generalized anxiety disorder)    Overview      cont buspar and lexapro.         Relevant Medications    busPIRone (BUSPAR) 10 MG tablet    sertraline (ZOLOFT) 100 MG tablet       Pulmonary    Bronchitis    Relevant Medications    albuterol (PROVENTIL/VENTOLIN HFA) 90 mcg/actuation inhaler       Endocrine    Uncontrolled type 1  diabetes mellitus with hyperglycemia - Primary    Relevant Orders    Hemoglobin A1C    Lipid Panel    Microalbumin/Creatinine Ratio, Urine    Comprehensive Metabolic Panel    Ambulatory referral/consult to Optometry    (In Office Administered) Pneumococcal Conjugate Vaccine (20 Valent) (IM)       GI    Rectal bleeding    Relevant Orders    Occult blood x 1, stool    Ambulatory referral/consult to Gastroenterology    Ambulatory referral/consult to Endo Procedure      Other Visit Diagnoses       Viral URI with cough

## 2022-11-07 ENCOUNTER — PATIENT OUTREACH (OUTPATIENT)
Dept: ADMINISTRATIVE | Facility: HOSPITAL | Age: 27
End: 2022-11-07
Payer: COMMERCIAL

## 2022-11-08 ENCOUNTER — LAB VISIT (OUTPATIENT)
Dept: LAB | Facility: HOSPITAL | Age: 27
End: 2022-11-08
Attending: FAMILY MEDICINE
Payer: COMMERCIAL

## 2022-11-08 DIAGNOSIS — E10.65 UNCONTROLLED TYPE 1 DIABETES MELLITUS WITH HYPERGLYCEMIA: ICD-10-CM

## 2022-11-08 PROCEDURE — 36415 COLL VENOUS BLD VENIPUNCTURE: CPT | Mod: PO | Performed by: FAMILY MEDICINE

## 2022-11-08 PROCEDURE — 83036 HEMOGLOBIN GLYCOSYLATED A1C: CPT | Performed by: FAMILY MEDICINE

## 2022-11-08 PROCEDURE — 80061 LIPID PANEL: CPT | Performed by: FAMILY MEDICINE

## 2022-11-08 PROCEDURE — 80053 COMPREHEN METABOLIC PANEL: CPT | Performed by: FAMILY MEDICINE

## 2022-11-09 LAB
ALBUMIN SERPL BCP-MCNC: 2.8 G/DL (ref 3.5–5.2)
ALP SERPL-CCNC: 152 U/L (ref 55–135)
ALT SERPL W/O P-5'-P-CCNC: 20 U/L (ref 10–44)
ANION GAP SERPL CALC-SCNC: 17 MMOL/L (ref 8–16)
AST SERPL-CCNC: 32 U/L (ref 10–40)
BILIRUB SERPL-MCNC: 0.3 MG/DL (ref 0.1–1)
BUN SERPL-MCNC: 10 MG/DL (ref 6–20)
CALCIUM SERPL-MCNC: 9.1 MG/DL (ref 8.7–10.5)
CHLORIDE SERPL-SCNC: 102 MMOL/L (ref 95–110)
CHOLEST SERPL-MCNC: 242 MG/DL (ref 120–199)
CHOLEST/HDLC SERPL: 3.9 {RATIO} (ref 2–5)
CO2 SERPL-SCNC: 19 MMOL/L (ref 23–29)
CREAT SERPL-MCNC: 0.7 MG/DL (ref 0.5–1.4)
EST. GFR  (NO RACE VARIABLE): >60 ML/MIN/1.73 M^2
ESTIMATED AVG GLUCOSE: 303 MG/DL (ref 68–131)
GLUCOSE SERPL-MCNC: 276 MG/DL (ref 70–110)
HBA1C MFR BLD: 12.2 % (ref 4–5.6)
HDLC SERPL-MCNC: 62 MG/DL (ref 40–75)
HDLC SERPL: 25.6 % (ref 20–50)
LDLC SERPL CALC-MCNC: 140.2 MG/DL (ref 63–159)
NONHDLC SERPL-MCNC: 180 MG/DL
POTASSIUM SERPL-SCNC: 3.5 MMOL/L (ref 3.5–5.1)
PROT SERPL-MCNC: 7.5 G/DL (ref 6–8.4)
SODIUM SERPL-SCNC: 138 MMOL/L (ref 136–145)
TRIGL SERPL-MCNC: 199 MG/DL (ref 30–150)

## 2022-11-10 ENCOUNTER — HOSPITAL ENCOUNTER (OUTPATIENT)
Dept: PREADMISSION TESTING | Facility: HOSPITAL | Age: 27
Discharge: HOME | End: 2022-11-10
Attending: FAMILY MEDICINE
Payer: COMMERCIAL

## 2022-11-10 DIAGNOSIS — K62.5 RECTAL BLEEDING: Primary | ICD-10-CM

## 2022-11-10 RX ORDER — SODIUM, POTASSIUM,MAG SULFATES 17.5-3.13G
1 SOLUTION, RECONSTITUTED, ORAL ORAL DAILY
Qty: 1 KIT | Refills: 0 | Status: SHIPPED | OUTPATIENT
Start: 2022-11-10 | End: 2022-11-12

## 2022-12-08 ENCOUNTER — OFFICE VISIT (OUTPATIENT)
Dept: DIABETES | Facility: CLINIC | Age: 27
End: 2022-12-08
Payer: COMMERCIAL

## 2022-12-08 DIAGNOSIS — F41.1 GAD (GENERALIZED ANXIETY DISORDER): ICD-10-CM

## 2022-12-08 DIAGNOSIS — E10.65 UNCONTROLLED TYPE 1 DIABETES MELLITUS WITH HYPERGLYCEMIA: Primary | ICD-10-CM

## 2022-12-08 PROCEDURE — 1159F PR MEDICATION LIST DOCUMENTED IN MEDICAL RECORD: ICD-10-PCS | Mod: CPTII,95,, | Performed by: NURSE PRACTITIONER

## 2022-12-08 PROCEDURE — 3060F PR POS MICROALBUMINURIA RESULT DOCUMENTED/REVIEW: ICD-10-PCS | Mod: CPTII,95,, | Performed by: NURSE PRACTITIONER

## 2022-12-08 PROCEDURE — 1160F RVW MEDS BY RX/DR IN RCRD: CPT | Mod: CPTII,95,, | Performed by: NURSE PRACTITIONER

## 2022-12-08 PROCEDURE — 99214 PR OFFICE/OUTPT VISIT, EST, LEVL IV, 30-39 MIN: ICD-10-PCS | Mod: 95,,, | Performed by: NURSE PRACTITIONER

## 2022-12-08 PROCEDURE — 3066F PR DOCUMENTATION OF TREATMENT FOR NEPHROPATHY: ICD-10-PCS | Mod: CPTII,95,, | Performed by: NURSE PRACTITIONER

## 2022-12-08 PROCEDURE — 99214 OFFICE O/P EST MOD 30 MIN: CPT | Mod: 95,,, | Performed by: NURSE PRACTITIONER

## 2022-12-08 PROCEDURE — 3046F PR MOST RECENT HEMOGLOBIN A1C LEVEL > 9.0%: ICD-10-PCS | Mod: CPTII,95,, | Performed by: NURSE PRACTITIONER

## 2022-12-08 PROCEDURE — 1160F PR REVIEW ALL MEDS BY PRESCRIBER/CLIN PHARMACIST DOCUMENTED: ICD-10-PCS | Mod: CPTII,95,, | Performed by: NURSE PRACTITIONER

## 2022-12-08 PROCEDURE — 3046F HEMOGLOBIN A1C LEVEL >9.0%: CPT | Mod: CPTII,95,, | Performed by: NURSE PRACTITIONER

## 2022-12-08 PROCEDURE — 1159F MED LIST DOCD IN RCRD: CPT | Mod: CPTII,95,, | Performed by: NURSE PRACTITIONER

## 2022-12-08 PROCEDURE — 3060F POS MICROALBUMINURIA REV: CPT | Mod: CPTII,95,, | Performed by: NURSE PRACTITIONER

## 2022-12-08 PROCEDURE — 3066F NEPHROPATHY DOC TX: CPT | Mod: CPTII,95,, | Performed by: NURSE PRACTITIONER

## 2022-12-08 RX ORDER — INSULIN ASPART 100 [IU]/ML
INJECTION, SOLUTION INTRAVENOUS; SUBCUTANEOUS
Qty: 30 ML | Refills: 2
Start: 2022-12-08 | End: 2023-02-16 | Stop reason: SDUPTHER

## 2022-12-08 RX ORDER — INSULIN DEGLUDEC 200 U/ML
52 INJECTION, SOLUTION SUBCUTANEOUS DAILY
Qty: 3 PEN | Refills: 2
Start: 2022-12-08 | End: 2023-03-01

## 2022-12-08 NOTE — PROGRESS NOTES
Established Patient - Virtual Telehealth Visit     The patient location is: Home ([place])  The chief complaint leading to consultation is: Diabetes Follow-up  Visit type: Virtual visit with synchronous audio and video via [employer]  Total time spent with patient: 15 minutes     Each patient to whom I provide medical services by telemedicine is:  (1) informed of the relationship between the physician and patient and the respective role of any other health care provider with respect to management of the patient; and (2) notified that they may decline to receive medical services by telemedicine and may withdraw from such care at any time. Patient verbally consented to receive this service via voice-only telephone call.    PCP: Miko Mcdonald MD    Subjective:     Chief Complaint: Diabetes follow up.  Last visit with me: 6/10/2022 for initial consult.    HPI: 26 y.o.  female for diabetes follow up.   Previously managed by SISI Barahona PA-C.  Last clinic visit 3/3/2021  Type I diabetes since age 13.    Comorbidities: Obesity by BMI, TRAY.    Previously on Medtronic pump.  Had trouble with frequent tubing kinks.  Been off the pump for 1 year.  Verbalized wanting to switch to Tandem pump.    Now doing basal bolus regimen.  Previously on sample CGM Dexcom.  Would like to be on CGM Dexcom.    Family History of Type 1/2 DM: yes, father  Known diabetes complications:  DKA/HHS: yes in 2018 & 2019  Retinopathy: no. [facility] last month  Peripheral neuropathy: no  Nephropathy: no    Interval history:  Since last visit, been off track with regards to diet.  Denies recent hospital admissions or ER visits.   Denies having hypoglycemia.   Endorses diabetes related symptoms: None.    Blood glucose monitoring via fingerstick: 2-4x/day   Per patient's recall, over the last 4 weeks   Fasting 180-250's   PP dinner/Bedtime 200-300's    Activity level: Sedentary- none  Meal Planning:3 meals/day;1  snack/day.     Medication compliance all of the time, diet compliance most of the time.   Has attended diabetes education in the past.     Previous regimen: Novolog via Medtronic    Past failed treatment: N/A    Current DM Medications:  Diabetes Medications               glucagon (BAQSIMI) 3 mg/actuation Spry 1 spray by Nasal route daily as needed (hypoglycemia).    glucagon, human recombinant, (GLUCAGON EMERGENCY KIT, HUMAN,) 1 mg SolR Inject 1 mg into the muscle daily as needed.    insulin aspart U-100 (NOVOLOG) 100 unit/mL injection Tid ac meals using  ICR 1:5, ISF 20, and Goal .     insulin degludec (TRESIBA FLEXTOUCH U-200) 200 unit/mL (3 mL) insulin pen Inject 42 Units into the skin once daily.  Taking 44 units     Allergies  Review of patient's allergies indicates:   Allergen Reactions    Augmentin [amoxicillin-pot clavulanate]      Paranoia         Labs Reviewed:  Her most recent A1C is:  Lab Results   Component Value Date    HGBA1C 12.2 (H) 11/08/2022    HGBA1C 11.7 (H) 06/10/2022    HGBA1C 10.4 (H) 06/01/2021       Her most recent BMP is:  Lab Results   Component Value Date     11/08/2022    K 3.5 11/08/2022     11/08/2022    CO2 19 (L) 11/08/2022    BUN 10 11/08/2022    CREATININE 0.7 11/08/2022    CALCIUM 9.1 11/08/2022    ANIONGAP 17 (H) 11/08/2022    ESTGFRAFRICA >60.0 10/01/2020    EGFRNONAA >60.0 10/01/2020       No results found for: CPEPTIDE  No results found for: GLUTAMICACID    There is no height or weight on file to calculate BMI.    Wt Readings from Last 3 Encounters:   11/04/22 1527 99 kg (218 lb 4.1 oz)   08/17/22 1358 100.7 kg (222 lb)   06/10/22 1326 103 kg (227 lb 1.2 oz)        Her blood sugar in clinic today is: Not assessed due to type of visit.    Diabetes Management Status  Statin: Not taking  ACE/ARB: Not taking    Screening or Prevention Patient's value Goal Complete/Controlled?   HgA1C Testing and Control   Lab Results   Component Value Date    HGBA1C 12.2 (H)  11/08/2022      Annually/Less than 8% No     Lipid profile : 11/08/2022 Annually Yes     LDL control Lab Results   Component Value Date    LDLCALC 140.2 11/08/2022    Annually/Less than 100 mg/dl  No     Nephropathy screening Lab Results   Component Value Date    MICALBCREAT 229.9 (H) 11/08/2022     No results found for: PROTEINUA Annually Yes     Blood pressure BP Readings from Last 1 Encounters:   11/04/22 112/74    Less than 140/90 Yes     Dilated retinal exam : 09/29/2020 Annually Yes    Foot exam   : 06/10/2022 Annually Yes       Social History     Socioeconomic History    Marital status:    Tobacco Use    Smoking status: Never    Smokeless tobacco: Never   Substance and Sexual Activity    Alcohol use: Never    Drug use: Never    Sexual activity: Yes     Partners: Male     Birth control/protection: None     Past Medical History:   Diagnosis Date    Anxiety     Depression     Diabetes mellitus     Diabetes mellitus type I     Hematochezia 7/16/2021    Hypercholesteremia     Uncontrolled type 1 diabetes mellitus with hyperglycemia 12/15/2020     Review of Systems   Constitutional: Negative for activity change, appetite change, fatigue and unexpected weight change.   HENT: Negative for dental problem and trouble swallowing.    Eyes: Negative for visual disturbance.   Respiratory: Negative for chest tightness and shortness of breath.    Cardiovascular: Negative for chest pain, palpitations and leg swelling.   Gastrointestinal: Negative for abdominal pain, constipation, diarrhea, nausea and vomiting.   Endocrine: Negative for polydipsia, polyphagia and polyuria.   Genitourinary: Negative for difficulty urinating, dysuria, frequency and urgency.        Negative yeast infection   Musculoskeletal: Negative for gait problem, myalgias and neck pain.   Integumentary:  Negative for rash and wound.   Allergic/Immunologic: Negative.    Neurological: Negative for dizziness, syncope, weakness, numbness and headaches.    Hematological: Negative.    Psychiatric/Behavioral: Negative for confusion and sleep disturbance.   All other systems reviewed and are negative.       Objective:      Physical Exam  Constitutional:       General: Awake.      Appearance: Normal appearance. Well-developed and well-groomed.   HENT:      Head: Normocephalic and atraumatic.   Eyes:      General: Lids are normal. Gaze aligned appropriately.   Pulmonary:      Effort: Pulmonary effort is normal. No respiratory distress.   Neurological:      Mental Status: Alert and oriented to person, place, and time.   Psychiatric:         Attention and Perception: Attention normal.         Mood and Affect: Mood and affect normal.         Speech: Speech normal.         Behavior: Behavior normal.         Thought Content: Thought content normal.         Cognition and Memory: Cognition normal.         Judgment: Judgment normal.       Assessment / Plan:     Diagnoses and all orders for this visit:    Uncontrolled type 1 diabetes mellitus with hyperglycemia  -     Hemoglobin A1C; Future  -     insulin degludec (TRESIBA FLEXTOUCH U-200) 200 unit/mL (3 mL) insulin pen; Inject 52 Units into the skin once daily.  -     insulin aspart U-100 (NOVOLOG) 100 unit/mL injection; Inject before each meal using  ICR 5, ISF 20, and Goal .    BMI 37.0-37.9, adult    TRAY (generalized anxiety disorder)     Additional Plan Details:  - Condition: uncontrolled, most recent A1C of  12.2% was completed 1 month ago. Increased from previous reading.  - Monitor blood glucose:  4-6x daily.Goals reviewed. Maintain BG log.  Patient would benefit from CGM use. Will order supplies via [place].  - CGM note: Patient is testing 4 times per day. Patient is injecting meal time insulin 3 times daily and is self adjusting insulin based on blood glucose reading and carbohydrate ratio. Patient requires CGM for blood sugar monitoring due to frequent insulin dose changes.  - Hypoglycemia protocol: Reviewed and  risk discussed.  Notify if BG readings below 80. Protocol printout provided.   - Diet: Limit carbohydrate intake 30-45 grams/meal, 3x daily and 15 grams/snack , limit of two daily.   - Activity: Recommend at least 150 minutes of exercise in a week.  - BP and LDL: Recommend lifestyle modifications and follow up with PCP for management.   - Medication Changes:   Increase Tresiba to 52 units every morning  Continue Novolog with carb ratio 1:5, correction factor 20 with goal blood sugar 120    - Medication consideration:  Titrate basal/bolus to goal blood sugar.  Plan to transition to a tandem pump when able.  - Lab results: A1C discussed.  - Health Maintenance standards addressed today: A1c to be scheduled and Eye exam up-to-date..   - Risks of uncontrolled DM explained. Importance of routine foot and eye exams reviewed. Scheduled as appropriate.  -The patient was explained the above plan and given opportunity to ask questions.  She understands, chooses and consents to this plan and accepts all the risks, which include but are not limited to the risks mentioned above.   - Labs ordered as above. A1c today. Plan to do Cpeptide and TRAY when able.  - CDE follow up: Will schedule for CGM training once supplies are available.   - Follow up:  2 months in clinic.         Charlotte T. Delacruz, FNP-C [facility]    A total of 15 minutes was spent in face to face time, of which over 50 % was spent in counseling patient on disease process, complications, treatment, and side effects of medications.

## 2022-12-08 NOTE — PATIENT INSTRUCTIONS
Medication Regimen:   Increase Tresiba to 52 units every morning  Continue Novolog with carb ratio 1:5, correction factor 20 with goal blood sugar 120    Patient Instructions:  Carbohydrate Count: 30-45 grams/meal and 15 grams/snack with limit of 2 snacks per day.  Exercise: Goal is 150 minutes or more per week.  Bring meter and blood sugar log to each appointment.     Goals for Blood Sugar:  Fastin-130 mg/dl  2 hours after meal:  mg/dl  Before Bed: 100-150 mg/dl  If Blood sugar is below 70 mg/dl, DO NOT take diabetes medication. Eat first and then recheck blood sugar in 20 minutes.  Foods to eat if blood sugar is below 70 mg/dl  1/2 glass of orange juice   1/2 regular cola can   3-4 hard candies   3-4 small glucose tabs.   Foods to eat if blood sugar is below 50 mg/dl  1 glass of orange juice  1 regular cola can  8 hard candies  8 small glucose tabs.   If you have repeated eating/blood sugar recheck process 2 times and blood sugar still below 70 mg/dl, call 911.

## 2022-12-08 NOTE — Clinical Note
A1c 2 mos F/u 2 mos in clinic [facility] after lab draw Order Dexcon supplies via [employer] to [employer] [facility] supply//

## 2022-12-13 PROBLEM — E10.65 TYPE 1 DIABETES MELLITUS WITH HYPERGLYCEMIA: Status: RESOLVED | Noted: 2021-05-25 | Resolved: 2022-12-13

## 2022-12-13 PROBLEM — Z00.00 WELLNESS EXAMINATION: Status: ACTIVE | Noted: 2022-12-13

## 2022-12-14 ENCOUNTER — PATIENT MESSAGE (OUTPATIENT)
Dept: INTERNAL MEDICINE | Facility: CLINIC | Age: 27
End: 2022-12-14
Payer: COMMERCIAL

## 2022-12-15 ENCOUNTER — PATIENT MESSAGE (OUTPATIENT)
Dept: DIABETES | Facility: CLINIC | Age: 27
End: 2022-12-15
Payer: COMMERCIAL

## 2022-12-30 ENCOUNTER — ANESTHESIA (OUTPATIENT)
Dept: ENDOSCOPY | Facility: HOSPITAL | Age: 27
End: 2022-12-30
Payer: COMMERCIAL

## 2022-12-30 ENCOUNTER — HOSPITAL ENCOUNTER (OUTPATIENT)
Facility: HOSPITAL | Age: 27
Discharge: HOME | End: 2022-12-30
Attending: INTERNAL MEDICINE | Admitting: INTERNAL MEDICINE
Payer: COMMERCIAL

## 2022-12-30 ENCOUNTER — ANESTHESIA EVENT (OUTPATIENT)
Dept: ENDOSCOPY | Facility: HOSPITAL | Age: 27
End: 2022-12-30
Payer: COMMERCIAL

## 2022-12-30 VITALS
RESPIRATION RATE: 15 BRPM | SYSTOLIC BLOOD PRESSURE: 127 MMHG | HEIGHT: 64 IN | WEIGHT: 220 LBS | HEART RATE: 90 BPM | BODY MASS INDEX: 37.56 KG/M2 | OXYGEN SATURATION: 100 % | TEMPERATURE: 98 F | DIASTOLIC BLOOD PRESSURE: 82 MMHG

## 2022-12-30 DIAGNOSIS — K62.5 RECTAL BLEED: ICD-10-CM

## 2022-12-30 LAB
B-HCG UR QL: NEGATIVE
CTP QC/QA: YES
GLUCOSE SERPL-MCNC: 211 MG/DL (ref 70–110)
POCT GLUCOSE: 211 MG/DL (ref 70–110)

## 2022-12-30 PROCEDURE — 81025 URINE PREGNANCY TEST: CPT | Performed by: INTERNAL MEDICINE

## 2022-12-30 PROCEDURE — 82962 GLUCOSE BLOOD TEST: CPT | Performed by: INTERNAL MEDICINE

## 2022-12-30 PROCEDURE — 45380 COLONOSCOPY AND BIOPSY: CPT | Mod: ,,, | Performed by: INTERNAL MEDICINE

## 2022-12-30 PROCEDURE — 63600175 PHARM REV CODE 636 W HCPCS: Performed by: NURSE ANESTHETIST, CERTIFIED REGISTERED

## 2022-12-30 PROCEDURE — 45380 COLONOSCOPY AND BIOPSY: CPT | Performed by: INTERNAL MEDICINE

## 2022-12-30 PROCEDURE — 37000009 HC ANESTHESIA EA ADD 15 MINS: Performed by: INTERNAL MEDICINE

## 2022-12-30 PROCEDURE — 27201012 HC FORCEPS, HOT/COLD, DISP: Performed by: INTERNAL MEDICINE

## 2022-12-30 PROCEDURE — 45380 PR COLONOSCOPY,BIOPSY: ICD-10-PCS | Mod: ,,, | Performed by: INTERNAL MEDICINE

## 2022-12-30 PROCEDURE — 88305 TISSUE EXAM BY PATHOLOGIST: CPT | Performed by: PATHOLOGY

## 2022-12-30 PROCEDURE — 25000003 PHARM REV CODE 250: Performed by: NURSE ANESTHETIST, CERTIFIED REGISTERED

## 2022-12-30 PROCEDURE — 88305 TISSUE EXAM BY PATHOLOGIST: CPT | Mod: 26,,, | Performed by: PATHOLOGY

## 2022-12-30 PROCEDURE — 88305 TISSUE EXAM BY PATHOLOGIST: ICD-10-PCS | Mod: 26,,, | Performed by: PATHOLOGY

## 2022-12-30 PROCEDURE — 37000008 HC ANESTHESIA 1ST 15 MINUTES: Performed by: INTERNAL MEDICINE

## 2022-12-30 RX ORDER — LIDOCAINE HCL/PF 100 MG/5ML
SYRINGE (ML) INTRAVENOUS
Status: DISCONTINUED | OUTPATIENT
Start: 2022-12-30 | End: 2022-12-30

## 2022-12-30 RX ORDER — PROPOFOL 10 MG/ML
VIAL (ML) INTRAVENOUS
Status: DISCONTINUED | OUTPATIENT
Start: 2022-12-30 | End: 2022-12-30

## 2022-12-30 RX ADMIN — PROPOFOL 50 MG: 10 INJECTION, EMULSION INTRAVENOUS at 09:12

## 2022-12-30 RX ADMIN — SODIUM CHLORIDE, POTASSIUM CHLORIDE, SODIUM LACTATE AND CALCIUM CHLORIDE: 600; 310; 30; 20 INJECTION, SOLUTION INTRAVENOUS at 08:12

## 2022-12-30 RX ADMIN — LIDOCAINE HYDROCHLORIDE 50 MG: 20 INJECTION, SOLUTION INTRAVENOUS at 09:12

## 2022-12-30 RX ADMIN — PROPOFOL 100 MG: 10 INJECTION, EMULSION INTRAVENOUS at 09:12

## 2022-12-30 NOTE — DISCHARGE SUMMARY
[facility] (Hospital)  Discharge Note  Short Stay    Procedure(s) (LRB):  COLONOSCOPY (N/A)      OUTCOME: Patient tolerated treatment/procedure well without complication and is now ready for discharge.    DISPOSITION: Home or Self Care    FINAL DIAGNOSIS:  Rectal bleed    FOLLOWUP: With primary care provider    DISCHARGE INSTRUCTIONS:  No discharge procedures on file.

## 2022-12-30 NOTE — PROVATION PATIENT INSTRUCTIONS
Discharge Summary/Instructions after an Endoscopic Procedure  Patient Name: Chey Chavira  Patient MRN: [id number]  Patient YOB: 1995 Friday, December 30, 2022 Marlon Correa MD  Dear patient,  As a result of recent federal legislation (The Federal Cures Act), you may   receive lab or pathology results from your procedure in your [facility]   account before your physician is able to contact you. Your physician or   their representative will relay the results to you with their   recommendations at their soonest availability.  Thank you,  RESTRICTIONS:  During your procedure today, you received medications for sedation.  These   medications may affect your judgment, balance and coordination.  Therefore,   for 24 hours, you have the following restrictions:   - DO NOT drive a car, operate machinery, make legal/financial decisions,   sign important papers or drink alcohol.    ACTIVITY:  Today: no heavy lifting, straining or running due to procedural   sedation/anesthesia.  The following day: return to full activity including work.  DIET:  Eat and drink normally unless instructed otherwise.     TREATMENT FOR COMMON SIDE EFFECTS:  - Mild abdominal pain, nausea, belching, bloating or excessive gas:  rest,   eat lightly and use a heating pad.  - Sore Throat: treat with throat lozenges and/or gargle with warm salt   water.  - Because air was used during the procedure, expelling large amounts of air   from your rectum or belching is normal.  - If a bowel prep was taken, you may not have a bowel movement for 1-3 days.    This is normal.  SYMPTOMS TO WATCH FOR AND REPORT TO YOUR PHYSICIAN:  1. Abdominal pain or bloating, other than gas cramps.  2. Chest pain.  3. Back pain.  4. Signs of infection such as: chills or fever occurring within 24 hours   after the procedure.  5. Rectal bleeding, which would show as bright red, maroon, or black stools.   (A tablespoon of blood from the rectum is not serious,  especially if   hemorrhoids are present.)  6. Vomiting.  7. Weakness or dizziness.  GO DIRECTLY TO THE NEAREST EMERGENCY ROOM IF YOU HAVE ANY OF THE FOLLOWING:      Difficulty breathing              Chills and/or fever over 101 F   Persistent vomiting and/or vomiting blood   Severe abdominal pain   Severe chest pain   Black, tarry stools   Bleeding- more than one tablespoon   Any other symptom or condition that you feel may need urgent attention  Your doctor recommends these additional instructions:  If any biopsies were taken, your doctors clinic will contact you in 1 to 2   weeks with any results.  - Discharge patient to home (via wheelchair).   - Resume previous diet.   - Continue present medications.   - Await pathology results.   - Repeat colonoscopy for surveillance based on pathology results or as per   age   - Return to referring physician as previously scheduled.  For questions, problems or results please call your physician Marlon Correa MD at Work:  [phone]  If you have any questions about the above instructions, call the GI   department at [phone] or call the endoscopy unit at [phone]   from 7am until 3 pm.  [facility], EMERGENCY ROOM PHONE NUMBER:   [phone]  IF A COMPLICATION OR EMERGENCY SITUATION ARISES AND YOU ARE UNABLE TO REACH   YOUR PHYSICIAN - GO DIRECTLY TO THE EMERGENCY ROOM.  I have read or have had read to me these discharge instructions for my   procedure and have received a written copy.  I understand these   instructions and will follow-up with my physician if I have any questions.     __________________________________       _____________________________________  Nurse Signature                                          Patient/Designated   Responsible Party Signature  MD Marlon Laughlin MD  12/30/2022 9:25:20 AM  This report has been verified and signed electronically.  Dear patient,  As a result of  recent federal legislation (The Federal Cures Act), you may   receive lab or pathology results from your procedure in your [facility]   account before your physician is able to contact you. Your physician or   their representative will relay the results to you with their   recommendations at their soonest availability.  Thank you,  [employer]OVATION

## 2022-12-30 NOTE — H&P
Short Stay Endoscopy History and Physical    PCP - Miko Mcdonald MD  Referring Physician - Miko Mcdonald MD  [id number] [place] [id number]    Procedure -Colonoscopy  ASA - per anesthesia  Mallampati - per anesthesia  History of Anesthesia problems - no  Family history Anesthesia problems -  no   Plan of anesthesia - General    HPI  27 y.o. female  Reason for procedure: Rectal bleed        ROS:  Constitutional: No fevers, chills, No weight loss  CV: No chest pain  Pulm: No cough, No shortness of breath  GI: see HPI    Medical History:  has a past medical history of Anxiety, Depression, Diabetes mellitus, Diabetes mellitus type I, Hematochezia (7/16/2021), Hypercholesteremia, Type 1 diabetes mellitus with hyperglycemia (5/25/2021), and Uncontrolled type 1 diabetes mellitus with hyperglycemia (12/15/2020).    Surgical History:  has a past surgical history that includes Tonsillectomy.    Family History: family history includes Arthritis in her mother; Diabetes in her father, maternal grandmother, paternal aunt, and paternal uncle; Hypertension in her father..    Social History:  reports that she has never smoked. She has never used smokeless tobacco. She reports that she does not drink alcohol and does not use drugs.    Review of patient's allergies indicates:   Allergen Reactions    Augmentin [amoxicillin-pot clavulanate]      Paranoia         Medications:   Medications Prior to Admission   Medication Sig Dispense Refill Last Dose    busPIRone (BUSPAR) 10 MG tablet Take 1 tablet (10 mg total) by mouth 3 (three) times daily. 90 tablet 0 Past Week    butalbital-acetaminophen-caffeine -40 mg (FIORICET, ESGIC) -40 mg per tablet Take 1 tablet by mouth every 4 (four) hours as needed for Pain. 30 tablet 0 Past Week    insulin aspart U-100 (NOVOLOG) 100 unit/mL injection Inject before each meal using  ICR 5, ISF 20, and Goal . 30 mL 2 12/30/2022    insulin degludec (TRESIBA FLEXTOUCH  "U-200) 200 unit/mL (3 mL) insulin pen Inject 52 Units into the skin once daily. 3 pen 2 12/29/2022    promethazine-dextromethorphan (PROMETHAZINE-DM) 6.25-15 mg/5 mL Syrp Take 5 mLs by mouth nightly as needed (Cough). 120 mL 0 Past Month    sertraline (ZOLOFT) 100 MG tablet Take 1 tablet (100 mg total) by mouth once daily. 30 tablet 0 Past Month    valACYclovir (VALTREX) 500 MG tablet Take 1 tablet (500 mg total) by mouth once daily. 30 tablet 6 Past Week    acetone, urine, test (KETOSTIX) Strp Check urine ketones when BG > 250       albuterol (PROVENTIL/VENTOLIN HFA) 90 mcg/actuation inhaler Inhale 2 puffs into the lungs every 4 (four) hours as needed for Wheezing or Shortness of Breath. 8 g 1     blood sugar diagnostic (CONTOUR NEXT TEST STRIPS) Strp Use 1 strip 4 (four) times daily. 100 each 11     desloratadine (CLARINEX) 5 mg tablet Clarinex 5 mg tablet   Take 1 tablet every day by oral route for 30 days.   More than a month    glucagon (BAQSIMI) 3 mg/actuation Spry 1 spray by Nasal route daily as needed (hypoglycemia). 2 each 1     glucagon, human recombinant, (GLUCAGON EMERGENCY KIT, HUMAN,) 1 mg SolR Inject 1 mg into the muscle daily as needed.       insulin syringe-needle U-100 1 mL 30 gauge x 5/16 Syrg 1 each by Misc.(Non-Drug; Combo Route) route 6 (six) times daily. 100 each 11     lancets Misc 1 each by Misc.(Non-Drug; Combo Route) route 6 (six) times daily. 100 each 11     nystatin-triamcinolone (MYCOLOG) ointment Apply topically 2 (two) times daily. 30 g 3     ondansetron (ZOFRAN-ODT) 4 MG TbDL Take 2 tablets (8 mg total) by mouth 2 (two) times daily. 30 tablet 0     pen needle, diabetic 31 gauge x 3/16" Ndle Use as directed to inject insulin       promethazine (PHENERGAN) 25 MG tablet Take 25-50 mg by mouth every 8 (eight) hours as needed.          Physical Exam:    Vital Signs:   Vitals:    12/30/22 0824   BP: 124/73   Pulse: 96   Resp: 15   Temp: 98.1 °F (36.7 °C)       General Appearance: Well " appearing in no acute distress  Abdomen: Soft, non tender, non distended with normal bowel sounds, no masses    Labs:  Lab Results   Component Value Date    WBC 8.04 10/01/2020    HGB 12.7 10/01/2020    HCT 43.8 10/01/2020     10/01/2020    CHOL 242 (H) 11/08/2022    TRIG 199 (H) 11/08/2022    HDL 62 11/08/2022    ALT 20 11/08/2022    AST 32 11/08/2022     11/08/2022    K 3.5 11/08/2022     11/08/2022    CREATININE 0.7 11/08/2022    BUN 10 11/08/2022    CO2 19 (L) 11/08/2022    TSH 2.001 06/01/2021    HGBA1C 12.2 (H) 11/08/2022       I have explained the risks and benefits of this endoscopic procedure to the patient including but not limited to bleeding, inflammation, infection, perforation, and death.    SEDATION PLAN: per anesthesia      History reviewed, vital signs satisfactory, cardiopulmonary status satisfactory, sedation options, risks and plans have been discussed with the patient  All their questions were answered and the patient agrees to the sedation procedures as planned and the patient is deemed an appropriate candidate for the sedation as planned.    Procedure explained to patient, informed consent obtained and placed in chart.        Marlon Correa MD

## 2022-12-30 NOTE — TRANSFER OF CARE
"Anesthesia Transfer of Care Note    Patient: Chey Chavira    Procedure(s) Performed: Procedure(s) (LRB):  COLONOSCOPY (N/A)    Patient location: GI    Anesthesia Type: MAC    Transport from OR: Transported from OR on room air with adequate spontaneous ventilation    Post pain: adequate analgesia    Post assessment: no apparent anesthetic complications    Post vital signs: stable    Level of consciousness: awake and alert    Nausea/Vomiting: no nausea/vomiting    Complications: none    Transfer of care protocol was followed      Last vitals:   Visit Vitals  /72 (BP Location: Left arm, Patient Position: Lying)   Pulse 96   Temp 36.7 °C (98.1 °F) (Temporal)   Resp 16   Ht 5' 4" (1.626 m)   Wt 99.8 kg (220 lb)   SpO2 99%   Breastfeeding No   BMI 37.76 kg/m²     "

## 2022-12-30 NOTE — ANESTHESIA PREPROCEDURE EVALUATION
12/30/2022  Chey Chavira is a 27 y.o., female.      Pre-op Assessment    I have reviewed the Patient Summary Reports.     I have reviewed the Nursing Notes. I have reviewed the NPO Status.      Review of Systems  Anesthesia Hx:  Denies Family Hx of Anesthesia complications.   Denies Personal Hx of Anesthesia complications.   Social:  Non-Smoker    Hematology/Oncology:  Hematology Normal   Oncology Normal     EENT/Dental:EENT/Dental Normal   Cardiovascular:  Cardiovascular Normal Exercise tolerance: good  ECG has been reviewed. Normal sinus rhythm   Nonspecific T wave abnormality   Abnormal ECG   No previous ECGs available   Confirmed by AKSHAT BUENO MD ([id number]) on 6/1/2021 8:22:46 PM    Pulmonary:  Pulmonary Normal    Renal/:  Renal/ Normal     Hepatic/GI:  Hepatic/GI Normal    Neurological:   Headaches    Endocrine:   Diabetes, well controlled, type 1    Psych:   Psychiatric History anxiety depression          Physical Exam  General: Well nourished    Airway:  Mallampati: I   Mouth Opening: Normal  TM Distance: Normal  Tongue: Normal  Neck ROM: Normal ROM    Dental:  Intact    Chest/Lungs:  Clear to auscultation, Normal Respiratory Rate    Heart:  Rate: Normal  Rhythm: Regular Rhythm  Sounds: Normal    Abdomen:  Normal, Soft, Nontender        Anesthesia Plan  Type of Anesthesia, risks & benefits discussed:    Anesthesia Type: MAC  Intra-op Monitoring Plan: Standard ASA Monitors  Induction:  IV  Informed Consent: Informed consent signed with the Patient and all parties understand the risks and agree with anesthesia plan.  All questions answered.   ASA Score: 2    Ready For Surgery From Anesthesia Perspective.     .

## 2022-12-30 NOTE — ANESTHESIA POSTPROCEDURE EVALUATION
Anesthesia Post Evaluation    Patient: Chey Chavira    Procedure(s) Performed: Procedure(s) (LRB):  COLONOSCOPY (N/A)    Final Anesthesia Type: MAC      Patient location during evaluation: GI PACU  Patient participation: Yes- Able to Participate  Level of consciousness: awake and alert  Post-procedure vital signs: reviewed and stable  Pain management: adequate  Airway patency: patent    PONV status at discharge: No PONV  Anesthetic complications: no      Cardiovascular status: blood pressure returned to baseline  Respiratory status: unassisted, spontaneous ventilation and room air  Hydration status: euvolemic  Follow-up not needed.          Vitals Value Taken Time   /72 12/30/22 0926   Temp 36.7 °C (98.1 °F) 12/30/22 0926   Pulse 96 12/30/22 0926   Resp 16 12/30/22 0926   SpO2 99 % 12/30/22 0926         No case tracking events are documented in the log.      Pain/Ciro Score: Ciro Score: 6 (12/30/2022  9:26 AM)

## 2023-01-05 ENCOUNTER — PATIENT OUTREACH (OUTPATIENT)
Dept: ADMINISTRATIVE | Facility: HOSPITAL | Age: 28
End: 2023-01-05
Payer: COMMERCIAL

## 2023-01-09 LAB
FINAL PATHOLOGIC DIAGNOSIS: NORMAL
Lab: NORMAL

## 2023-01-10 ENCOUNTER — PATIENT MESSAGE (OUTPATIENT)
Dept: HEPATOLOGY | Facility: CLINIC | Age: 28
End: 2023-01-10
Payer: COMMERCIAL

## 2023-01-10 ENCOUNTER — OFFICE VISIT (OUTPATIENT)
Dept: INTERNAL MEDICINE | Facility: CLINIC | Age: 28
End: 2023-01-10
Payer: COMMERCIAL

## 2023-01-10 VITALS
OXYGEN SATURATION: 100 % | HEART RATE: 105 BPM | DIASTOLIC BLOOD PRESSURE: 78 MMHG | TEMPERATURE: 98 F | WEIGHT: 224.88 LBS | BODY MASS INDEX: 38.6 KG/M2 | SYSTOLIC BLOOD PRESSURE: 128 MMHG

## 2023-01-10 DIAGNOSIS — Z86.19 HISTORY OF CHLAMYDIA: ICD-10-CM

## 2023-01-10 DIAGNOSIS — Z00.00 ROUTINE GENERAL MEDICAL EXAMINATION AT A HEALTH CARE FACILITY: Primary | ICD-10-CM

## 2023-01-10 DIAGNOSIS — E10.65 TYPE 1 DIABETES MELLITUS WITH HYPERGLYCEMIA: ICD-10-CM

## 2023-01-10 DIAGNOSIS — B00.9 HERPES SIMPLEX: ICD-10-CM

## 2023-01-10 DIAGNOSIS — F41.1 GAD (GENERALIZED ANXIETY DISORDER): ICD-10-CM

## 2023-01-10 DIAGNOSIS — R51.9 NONINTRACTABLE HEADACHE, UNSPECIFIED CHRONICITY PATTERN, UNSPECIFIED HEADACHE TYPE: ICD-10-CM

## 2023-01-10 PROCEDURE — 1160F PR REVIEW ALL MEDS BY PRESCRIBER/CLIN PHARMACIST DOCUMENTED: ICD-10-PCS | Mod: CPTII,S$GLB,, | Performed by: FAMILY MEDICINE

## 2023-01-10 PROCEDURE — 99395 PREV VISIT EST AGE 18-39: CPT | Mod: S$GLB,,, | Performed by: FAMILY MEDICINE

## 2023-01-10 PROCEDURE — 3008F BODY MASS INDEX DOCD: CPT | Mod: CPTII,S$GLB,, | Performed by: FAMILY MEDICINE

## 2023-01-10 PROCEDURE — 99999 PR PBB SHADOW E&M-EST. PATIENT-LVL III: ICD-10-PCS | Mod: PBBFAC,,, | Performed by: FAMILY MEDICINE

## 2023-01-10 PROCEDURE — 99395 PR PREVENTIVE VISIT,EST,18-39: ICD-10-PCS | Mod: S$GLB,,, | Performed by: FAMILY MEDICINE

## 2023-01-10 PROCEDURE — 3074F SYST BP LT 130 MM HG: CPT | Mod: CPTII,S$GLB,, | Performed by: FAMILY MEDICINE

## 2023-01-10 PROCEDURE — 3074F PR MOST RECENT SYSTOLIC BLOOD PRESSURE < 130 MM HG: ICD-10-PCS | Mod: CPTII,S$GLB,, | Performed by: FAMILY MEDICINE

## 2023-01-10 PROCEDURE — 1160F RVW MEDS BY RX/DR IN RCRD: CPT | Mod: CPTII,S$GLB,, | Performed by: FAMILY MEDICINE

## 2023-01-10 PROCEDURE — 3078F DIAST BP <80 MM HG: CPT | Mod: CPTII,S$GLB,, | Performed by: FAMILY MEDICINE

## 2023-01-10 PROCEDURE — 3078F PR MOST RECENT DIASTOLIC BLOOD PRESSURE < 80 MM HG: ICD-10-PCS | Mod: CPTII,S$GLB,, | Performed by: FAMILY MEDICINE

## 2023-01-10 PROCEDURE — 3008F PR BODY MASS INDEX (BMI) DOCUMENTED: ICD-10-PCS | Mod: CPTII,S$GLB,, | Performed by: FAMILY MEDICINE

## 2023-01-10 PROCEDURE — 1159F PR MEDICATION LIST DOCUMENTED IN MEDICAL RECORD: ICD-10-PCS | Mod: CPTII,S$GLB,, | Performed by: FAMILY MEDICINE

## 2023-01-10 PROCEDURE — 1159F MED LIST DOCD IN RCRD: CPT | Mod: CPTII,S$GLB,, | Performed by: FAMILY MEDICINE

## 2023-01-10 PROCEDURE — 99999 PR PBB SHADOW E&M-EST. PATIENT-LVL III: CPT | Mod: PBBFAC,,, | Performed by: FAMILY MEDICINE

## 2023-01-10 RX ORDER — SERTRALINE HYDROCHLORIDE 100 MG/1
100 TABLET, FILM COATED ORAL DAILY
Qty: 90 TABLET | Refills: 2 | Status: SHIPPED | OUTPATIENT
Start: 2023-01-10 | End: 2023-04-10

## 2023-01-10 RX ORDER — BUTALBITAL, ACETAMINOPHEN AND CAFFEINE 50; 325; 40 MG/1; MG/1; MG/1
1 TABLET ORAL EVERY 4 HOURS PRN
Qty: 30 TABLET | Refills: 0 | Status: SHIPPED | OUTPATIENT
Start: 2023-01-10 | End: 2023-03-29 | Stop reason: SDUPTHER

## 2023-01-10 NOTE — PROGRESS NOTES
Subjective:       Patient ID: Chey Chavira is a 27 y.o. female.    Chief Complaint: No chief complaint on file.    Chey Chavira is a 27 y.o. female and is here for a comprehensive physical exam.    Do you take any herbs or supplements that were not prescribed by a doctor? no  Are you taking calcium supplements? no  Are you taking aspirin daily? no     History:  Any STD's in the past? History of chlamydia    The following portions of the patient's history were reviewed and updated as appropriate: allergies, current medications, past family history, past medical history, past social history, past surgical history and problem list.    Review of Systems  Do you have pain that bothers you in your daily life? no  Pertinent items are noted in HPI.      2. Patient Counseling:  --Nutrition: Stressed importance of moderation in sodium/caffeine intake, saturated fat and cholesterol, caloric balance.  --Exercise: Stressed the importance of regular exercise.   --Substance Abuse: Discussed cessation/primary prevention of tobacco, alcohol - nonuser   --Sexuality: Discussed sexually transmitted disease.  --Injury prevention: Discussed safety belts, smoke detector.   --Dental health: Discussed dental health.  --Immunizations reviewed.      3. Discussed the patient's BMI.  4. Follow up as needed for acute illness        Review of Systems   Constitutional:  Negative for fever.   HENT:  Negative for congestion.    Eyes:  Negative for discharge.   Respiratory:  Negative for shortness of breath.    Cardiovascular:  Negative for chest pain.   Gastrointestinal:  Negative for abdominal pain.   Genitourinary:  Negative for difficulty urinating.   Musculoskeletal:  Negative for joint swelling.   Neurological:  Negative for dizziness.   Psychiatric/Behavioral:  Negative for agitation.      Objective:      Physical Exam  Vitals and nursing note reviewed.   Constitutional:       General: She is not in acute distress.     Appearance:  Normal appearance. She is well-developed. She is not diaphoretic.   HENT:      Head: Normocephalic and atraumatic.   Eyes:      General: No scleral icterus.     Conjunctiva/sclera: Conjunctivae normal.   Cardiovascular:      Rate and Rhythm: Normal rate and regular rhythm.   Pulmonary:      Effort: Pulmonary effort is normal. No respiratory distress.      Breath sounds: Normal breath sounds. No wheezing.   Abdominal:      General: There is no distension.      Palpations: Abdomen is soft.      Tenderness: There is no abdominal tenderness. There is no guarding.   Skin:     General: Skin is warm.      Coloration: Skin is not pale.      Findings: No erythema or rash.      Comments: Good turgor   Neurological:      Mental Status: She is alert.   Psychiatric:         Mood and Affect: Mood normal.         Thought Content: Thought content normal.       Assessment:       1. Routine general medical examination at a health care facility    2. Nonintractable headache, unspecified chronicity pattern, unspecified headache type    3. TRAY (generalized anxiety disorder)    4. Type 1 diabetes mellitus with hyperglycemia    5. History of chlamydia    6. Herpes simplex          Plan:     Problem List Items Addressed This Visit          Neuro    Nonintractable headache    Relevant Medications    butalbital-acetaminophen-caffeine -40 mg (FIORICET, ESGIC) -40 mg per tablet       Psychiatric    TRAY (generalized anxiety disorder)    Overview      cont buspar and lexapro.         Relevant Medications    sertraline (ZOLOFT) 100 MG tablet       ID    Herpes simplex    History of chlamydia       Endocrine    Type 1 diabetes mellitus with hyperglycemia    Relevant Orders    Ambulatory referral/consult to Optometry       Other    Routine general medical examination at a health care facility - Primary    Relevant Orders    CBC Auto Differential    Hemoglobin A1C    Ferritin    Iron and TIBC    TSH

## 2023-01-13 ENCOUNTER — PATIENT MESSAGE (OUTPATIENT)
Dept: DIABETES | Facility: CLINIC | Age: 28
End: 2023-01-13
Payer: COMMERCIAL

## 2023-01-25 ENCOUNTER — PATIENT MESSAGE (OUTPATIENT)
Dept: ADMINISTRATIVE | Facility: HOSPITAL | Age: 28
End: 2023-01-25
Payer: COMMERCIAL

## 2023-02-02 ENCOUNTER — PATIENT MESSAGE (OUTPATIENT)
Dept: DIABETES | Facility: CLINIC | Age: 28
End: 2023-02-02
Payer: COMMERCIAL

## 2023-02-16 ENCOUNTER — PATIENT MESSAGE (OUTPATIENT)
Dept: DIABETES | Facility: CLINIC | Age: 28
End: 2023-02-16
Payer: COMMERCIAL

## 2023-02-16 DIAGNOSIS — E10.65 UNCONTROLLED TYPE 1 DIABETES MELLITUS WITH HYPERGLYCEMIA: ICD-10-CM

## 2023-02-16 NOTE — TELEPHONE ENCOUNTER
Pt sent portal message stating: Good afternoon. I got called into work today which is why I missed the appointment. I need to be rescheduled. But I also need a refill on my Novolog. Thanks in advance.     Rescheduled appt. LOV 12/8/22

## 2023-02-17 RX ORDER — INSULIN ASPART 100 [IU]/ML
INJECTION, SOLUTION INTRAVENOUS; SUBCUTANEOUS
Qty: 30 ML | Refills: 3 | Status: SHIPPED | OUTPATIENT
Start: 2023-02-17

## 2023-02-28 NOTE — PROGRESS NOTES
Established Patient - Virtual Telehealth Visit     The patient location is: Home ([place])  The chief complaint leading to consultation is: Diabetes Follow-up  Visit type: Virtual visit with synchronous audio and video via [employer] demetra  Total time spent with patient: 15 minutes     Each patient to whom I provide medical services by telemedicine is:  (1) informed of the relationship between the physician and patient and the respective role of any other health care provider with respect to management of the patient; and (2) notified that they may decline to receive medical services by telemedicine and may withdraw from such care at any time. Patient verbally consented to receive this service via voice-only telephone call.  PCP: Miko Mcdonald MD    Subjective:     Chief Complaint: Diabetes follow up.  Last visit with me: 12/8/2022    HPI: 27 y.o.  female for diabetes follow up.   Previously managed by SISI Barahona PA-C.  Last clinic visit 3/3/2021  Type I diabetes since age 13.    Comorbidities: Obesity by BMI, TRAY.    Family History of Type 1/2 DM: yes, father  Known diabetes complications:  DKA/HHS: yes in 2018 & 2019  Retinopathy: no.   Peripheral neuropathy: no  Nephropathy: no    Previously on Medtronic pump.  Had trouble with frequent tubing kinks.  Been off the pump for greater than 1 year.  Verbalized wanting to switch to Tandem pump.    Now doing basal bolus regimen.  Previously on CGM Dexcom.  Resumed use few weeks ago, using Dexcom demetra.  Not set up to share data  with clinic.    Interval history:  Since last visit, Tresiba dose increased.  States was taking 44 units and noticed frequent lows.  Self adjusted and decreased to 38 units.    Admits good insulin coverage for mealtime.  Postprandial hyperglycemia immediately after meals however readings normalizes in about 2 hours.    Admits to not bolusing for snacks.  Hyperglycemia noted PP snacks.    Denies recent hospital admissions or ER  visits.   Denies having further hypoglycemia after Tresiba dose decrease.  Endorses diabetes related symptoms: None.    Blood glucose monitoring via personal CGM Dexcom G6.  Unable to download data as pt not set up to share data with clinic.  Per patient's recall, over the last 2 weeks   Fasting 190-260's    Activity level: Sedentary- none  Meal Planning:3 meals/day;1 snack/day.     Medication compliance all of the time, diet compliance most of the time.   Has attended diabetes education in the past.     Previous regimen: Novolog via [employer]    Past failed treatment: N/A    Current DM Medications:  Diabetes Medications               glucagon (BAQSIMI) 3 mg/actuation Spry 1 spray by Nasal route daily as needed (hypoglycemia).    glucagon, human recombinant, (GLUCAGON EMERGENCY KIT, HUMAN,) 1 mg SolR Inject 1 mg into the muscle daily as needed.    insulin aspart U-100 (NOVOLOG) 100 unit/mL injection Inject three times a day before each meal as directed.  Tid ac meals using  ICR 1:5, ISF 20, and Goal .     insulin degludec (TRESIBA FLEXTOUCH U-200) 200 unit/mL (3 mL) insulin pen Inject 52 Units into the skin once daily.  Taking 38 units       Allergies  Review of patient's allergies indicates:   Allergen Reactions    Augmentin [amoxicillin-pot clavulanate]      Paranoia         Labs Reviewed:  Her most recent A1C is:  Lab Results   Component Value Date    HGBA1C 12.2 (H) 11/08/2022    HGBA1C 11.7 (H) 06/10/2022    HGBA1C 10.4 (H) 06/01/2021       Her most recent BMP is:  Lab Results   Component Value Date     11/08/2022    K 3.5 11/08/2022     11/08/2022    CO2 19 (L) 11/08/2022    BUN 10 11/08/2022    CREATININE 0.7 11/08/2022    CALCIUM 9.1 11/08/2022    ANIONGAP 17 (H) 11/08/2022    ESTGFRAFRICA >60.0 10/01/2020    EGFRNONAA >60.0 10/01/2020       No results found for: CPEPTIDE  No results found for: GLUTAMICACID    There is no height or weight on file to calculate BMI.    Wt Readings from Last 3  Encounters:   01/13/23 1036 104.3 kg (230 lb)   01/10/23 1648 102 kg (224 lb 13.9 oz)   12/30/22 0824 99.8 kg (220 lb)        Her blood sugar in clinic today is: Not assessed due to type of visit.    Diabetes Management Status  Statin: Not taking  ACE/ARB: Not taking    Screening or Prevention Patient's value Goal Complete/Controlled?   HgA1C Testing and Control   Lab Results   Component Value Date    HGBA1C 12.2 (H) 11/08/2022      Annually/Less than 8% No     Lipid profile : 11/08/2022 Annually Yes     LDL control Lab Results   Component Value Date    LDLCALC 140.2 11/08/2022    Annually/Less than 100 mg/dl  No     Nephropathy screening Lab Results   Component Value Date    MICALBCREAT 229.9 (H) 11/08/2022     Lab Results   Component Value Date    PROTEINUA 1+ (A) 01/13/2023    Annually Yes     Blood pressure BP Readings from Last 1 Encounters:   01/13/23 124/88    Less than 140/90 Yes     Dilated retinal exam : 09/29/2020 Annually Yes    Foot exam   : 06/10/2022 Annually Yes       Social History     Socioeconomic History    Marital status:    Tobacco Use    Smoking status: Never    Smokeless tobacco: Never   Substance and Sexual Activity    Alcohol use: Never    Drug use: Never    Sexual activity: Yes     Partners: Male     Birth control/protection: None     Past Medical History:   Diagnosis Date    Anxiety     Depression     Diabetes mellitus     Diabetes mellitus type I     Hematochezia 7/16/2021    Hypercholesteremia     Type 1 diabetes mellitus with hyperglycemia 5/25/2021    Uncontrolled type 1 diabetes mellitus with hyperglycemia 12/15/2020     Review of Systems   Constitutional: Negative for activity change, appetite change, fatigue and unexpected weight change.   HENT: Negative for dental problem and trouble swallowing.    Eyes: Negative for visual disturbance.   Respiratory: Negative for chest tightness and shortness of breath.    Cardiovascular: Negative for chest pain, palpitations and leg  swelling.   Gastrointestinal: Negative for abdominal pain, constipation, diarrhea, nausea and vomiting.   Endocrine: Negative for polydipsia, polyphagia and polyuria.   Genitourinary: Negative for difficulty urinating, dysuria, frequency and urgency.        Negative yeast infection   Musculoskeletal: Negative for gait problem, myalgias and neck pain.   Integumentary:  Negative for rash and wound.   Allergic/Immunologic: Negative.    Neurological: Negative for dizziness, syncope, weakness, numbness and headaches.   Hematological: Negative.    Psychiatric/Behavioral: Negative for confusion and sleep disturbance.   All other systems reviewed and are negative.       Objective:      Physical Exam  Constitutional:       General: Awake.      Appearance: Normal appearance. Well-developed and well-groomed.   HENT:      Head: Normocephalic and atraumatic.   Eyes:      General: Lids are normal. Gaze aligned appropriately.   Pulmonary:      Effort: Pulmonary effort is normal. No respiratory distress.   Neurological:      Mental Status: Alert and oriented to person, place, and time.   Psychiatric:         Attention and Perception: Attention normal.         Mood and Affect: Mood and affect normal.         Speech: Speech normal.         Behavior: Behavior normal.         Thought Content: Thought content normal.         Cognition and Memory: Cognition normal.         Judgment: Judgment normal.       Assessment / Plan:     Diagnoses and all orders for this visit:    Uncontrolled type 1 diabetes mellitus with hyperglycemia  -     Hemoglobin A1C; Future  -     insulin degludec (TRESIBA FLEXTOUCH U-200) 200 unit/mL (3 mL) insulin pen; Inject 40 Units into the skin once daily.    BMI 37.0-37.9, adult    Additional Plan Details:  - Condition: uncontrolled, most recent A1C of  12.2% was completed 3 months ago. Increased from previous reading.  - Monitor blood glucose:  4-6x daily.Goals reviewed. Maintain BG log.  Patient would benefit  from CGM use. Will order supplies via [employer].  - CGM note: Patient is testing 4 times per day. Patient is injecting meal time insulin 3 times daily and is self adjusting insulin based on blood glucose reading and carbohydrate ratio. Patient requires CGM for blood sugar monitoring due to frequent insulin dose changes.  - Hypoglycemia protocol: Reviewed and risk discussed.  Notify if BG readings below 80. Protocol printout provided.   - Diet: Limit carbohydrate intake 30-45 grams/meal, 3x daily and 15 grams/snack , limit of two daily.   - Activity: Recommend at least 150 minutes of exercise in a week.  - BP and LDL: Recommend lifestyle modifications and follow up with PCP for management.   - Medication Regimen:     Increase Tresiba to 40 units every morning. If fasting blood glucose in the next 3 consecutive days is greater than 130, increase to 42 units.  Continue Novolog before each meal using carb ratio 1:5, correction factor 20 with goal blood sugar 120  Begin Novolog before snacks using carb ratio of 1:5    - Medication consideration:  Titrate basal/bolus to goal blood sugar. Will switch to Humalog pens on next visit. Plan to transition to a tandem pump when able.  - Lab results: A1C discussed.  - Health Maintenance standards addressed today: A1c to be scheduled and Eye exam up-to-date at [facility] 11/2022.  - Risks of uncontrolled DM explained. Importance of routine foot and eye exams reviewed. Scheduled as appropriate.  -The patient was explained the above plan and given opportunity to ask questions.  She understands, chooses and consents to this plan and accepts all the risks, which include but are not limited to the risks mentioned above.   - Labs ordered as above. Plan to do Cpeptide and TRAY when able.  - CDE follow up: Deferred  - Follow up:  4 weeks virtual.         Charlotte T. Delacruz, FNP-C [facility]    A total of 15 minutes was spent in face to face time, of which over  50 % was spent in counseling patient on disease process, complications, treatment, and side effects of medications.

## 2023-03-01 ENCOUNTER — OFFICE VISIT (OUTPATIENT)
Dept: DIABETES | Facility: CLINIC | Age: 28
End: 2023-03-01
Payer: COMMERCIAL

## 2023-03-01 DIAGNOSIS — E10.65 UNCONTROLLED TYPE 1 DIABETES MELLITUS WITH HYPERGLYCEMIA: Primary | ICD-10-CM

## 2023-03-01 PROCEDURE — 1159F PR MEDICATION LIST DOCUMENTED IN MEDICAL RECORD: ICD-10-PCS | Mod: CPTII,95,, | Performed by: NURSE PRACTITIONER

## 2023-03-01 PROCEDURE — 99214 PR OFFICE/OUTPT VISIT, EST, LEVL IV, 30-39 MIN: ICD-10-PCS | Mod: 95,,, | Performed by: NURSE PRACTITIONER

## 2023-03-01 PROCEDURE — 1160F PR REVIEW ALL MEDS BY PRESCRIBER/CLIN PHARMACIST DOCUMENTED: ICD-10-PCS | Mod: CPTII,95,, | Performed by: NURSE PRACTITIONER

## 2023-03-01 PROCEDURE — 99214 OFFICE O/P EST MOD 30 MIN: CPT | Mod: 95,,, | Performed by: NURSE PRACTITIONER

## 2023-03-01 PROCEDURE — 1160F RVW MEDS BY RX/DR IN RCRD: CPT | Mod: CPTII,95,, | Performed by: NURSE PRACTITIONER

## 2023-03-01 PROCEDURE — 1159F MED LIST DOCD IN RCRD: CPT | Mod: CPTII,95,, | Performed by: NURSE PRACTITIONER

## 2023-03-01 RX ORDER — INSULIN DEGLUDEC 200 U/ML
40 INJECTION, SOLUTION SUBCUTANEOUS DAILY
Qty: 3 PEN | Refills: 2
Start: 2023-03-01 | End: 2023-04-05

## 2023-03-01 NOTE — PATIENT INSTRUCTIONS
Medication Regimen:   Increase Tresiba to 40 units every morning. If fasting blood glucose in the next 3 consecutive days is greater than 130, increase to 42 units.  Continue Novolog before each meal using carb ratio 1:5, correction factor 20 with goal blood sugar 120  Begin Novolog before snacks using carb ratio of 1:5    Patient Instructions:  Carbohydrate Count: 30-45 grams/meal and 15 grams/snack with limit of 2 snacks per day.  Exercise: Goal is 150 minutes or more per week.  Bring meter and blood sugar log to each appointment.     Goals for Blood Sugar:  Fastin-130 mg/dl  2 hours after meal:  mg/dl  Before Bed: 100-150 mg/dl  If Blood sugar is below 70 mg/dl, DO NOT take diabetes medication. Eat first and then recheck blood sugar in 20 minutes.  Foods to eat if blood sugar is below 70 mg/dl  1/2 glass of orange juice   1/2 regular cola can   3-4 hard candies   3-4 small glucose tabs.   Foods to eat if blood sugar is below 50 mg/dl  1 glass of orange juice  1 regular cola can  8 hard candies  8 small glucose tabs.   If you have repeated eating/blood sugar recheck process 2 times and blood sugar still below 70 mg/dl, call 911.

## 2023-03-01 NOTE — Clinical Note
Schedule A1c matthieu, [facility] lab Call patient to set up Dexcom clarity to share with clinic F/u virtual 4 wks//

## 2023-03-20 PROBLEM — Z00.00 WELLNESS EXAMINATION: Status: RESOLVED | Noted: 2022-12-13 | Resolved: 2023-03-20

## 2023-03-21 ENCOUNTER — PATIENT OUTREACH (OUTPATIENT)
Dept: ADMINISTRATIVE | Facility: HOSPITAL | Age: 28
End: 2023-03-21
Payer: COMMERCIAL

## 2023-03-21 NOTE — LETTER
[facility]    AUTHORIZATION FOR RELEASE OF   CONFIDENTIAL INFORMATION      We are seeing Chey Chavira, date of birth 1995, in the clinic at [facility]. Miko Mcdonald MD is the patient's PCP. Chey Chavira has an outstanding lab/procedure at the time we reviewed her chart. In order to help keep her health information updated, she has authorized us to request the following medical record(s):        (  )  MAMMOGRAM                                      (  )  COLONOSCOPY      (  )  PAP SMEAR                                          (  )  OUTSIDE LAB RESULTS     (  )  DEXA SCAN                                          ( x )  EYE EXAM            (  )  FOOT EXAM                                          (  )  ENTIRE RECORD     (  )  OUTSIDE IMMUNIZATIONS                 (  )  _______________         Please fax records to [facility], Jason P Schrock, MD, [phone]     If you have any questions, please contact Saloni FOLEY at [phone].           Patient Name: Chey Chavira  : 1995  Patient Phone #: [phone]

## 2023-03-29 ENCOUNTER — PATIENT MESSAGE (OUTPATIENT)
Dept: INTERNAL MEDICINE | Facility: CLINIC | Age: 28
End: 2023-03-29
Payer: COMMERCIAL

## 2023-03-29 DIAGNOSIS — R51.9 NONINTRACTABLE HEADACHE, UNSPECIFIED CHRONICITY PATTERN, UNSPECIFIED HEADACHE TYPE: ICD-10-CM

## 2023-03-29 RX ORDER — BUTALBITAL, ACETAMINOPHEN AND CAFFEINE 50; 325; 40 MG/1; MG/1; MG/1
1 TABLET ORAL EVERY 4 HOURS PRN
Qty: 30 TABLET | Refills: 0 | Status: SHIPPED | OUTPATIENT
Start: 2023-03-29

## 2023-04-03 PROBLEM — K62.5 RECTAL BLEED: Status: RESOLVED | Noted: 2022-11-04 | Resolved: 2023-04-03

## 2023-04-17 PROBLEM — Z00.00 ROUTINE GENERAL MEDICAL EXAMINATION AT A HEALTH CARE FACILITY: Status: RESOLVED | Noted: 2023-01-10 | Resolved: 2023-04-17

## 2023-04-19 ENCOUNTER — PATIENT MESSAGE (OUTPATIENT)
Dept: ADMINISTRATIVE | Facility: HOSPITAL | Age: 28
End: 2023-04-19
Payer: COMMERCIAL

## 2023-05-09 ENCOUNTER — PATIENT MESSAGE (OUTPATIENT)
Dept: ADMINISTRATIVE | Facility: HOSPITAL | Age: 28
End: 2023-05-09
Payer: COMMERCIAL

## 2023-06-13 ENCOUNTER — PATIENT MESSAGE (OUTPATIENT)
Dept: RESEARCH | Facility: HOSPITAL | Age: 28
End: 2023-06-13
Payer: COMMERCIAL

## 2023-06-27 ENCOUNTER — PATIENT MESSAGE (OUTPATIENT)
Dept: RESEARCH | Facility: HOSPITAL | Age: 28
End: 2023-06-27
Payer: COMMERCIAL

## 2023-07-05 ENCOUNTER — PATIENT MESSAGE (OUTPATIENT)
Dept: RESEARCH | Facility: HOSPITAL | Age: 28
End: 2023-07-05
Payer: COMMERCIAL

## 2023-07-19 ENCOUNTER — PATIENT MESSAGE (OUTPATIENT)
Dept: RESEARCH | Facility: HOSPITAL | Age: 28
End: 2023-07-19
Payer: COMMERCIAL

## 2023-07-24 ENCOUNTER — PATIENT MESSAGE (OUTPATIENT)
Dept: RESEARCH | Facility: HOSPITAL | Age: 28
End: 2023-07-24
Payer: COMMERCIAL

## 2023-09-15 ENCOUNTER — OFFICE VISIT (OUTPATIENT)
Dept: OBSTETRICS AND GYNECOLOGY | Facility: CLINIC | Age: 28
End: 2023-09-15
Payer: COMMERCIAL

## 2023-09-15 VITALS — WEIGHT: 238.13 LBS | HEIGHT: 64 IN | BODY MASS INDEX: 40.65 KG/M2

## 2023-09-15 DIAGNOSIS — Z20.2 EXPOSURE TO CHLAMYDIA: Primary | ICD-10-CM

## 2023-09-15 DIAGNOSIS — R82.90 ABNORMAL URINE ODOR: ICD-10-CM

## 2023-09-15 LAB
BILIRUBIN, UA POC OHS: NEGATIVE
BLOOD, UA POC OHS: ABNORMAL
CLARITY, UA POC OHS: CLEAR
COLOR, UA POC OHS: YELLOW
GLUCOSE, UA POC OHS: 250
KETONES, UA POC OHS: NEGATIVE
LEUKOCYTES, UA POC OHS: ABNORMAL
NITRITE, UA POC OHS: POSITIVE
PH, UA POC OHS: 6
PROTEIN, UA POC OHS: >=300
SPECIFIC GRAVITY, UA POC OHS: >=1.03
UROBILINOGEN, UA POC OHS: 1

## 2023-09-15 PROCEDURE — 87591 N.GONORRHOEAE DNA AMP PROB: CPT

## 2023-09-15 PROCEDURE — 99999 PR PBB SHADOW E&M-EST. PATIENT-LVL III: CPT | Mod: PBBFAC,,,

## 2023-09-15 PROCEDURE — 87077 CULTURE AEROBIC IDENTIFY: CPT

## 2023-09-15 PROCEDURE — 81003 POCT URINALYSIS(INSTRUMENT): ICD-10-PCS | Mod: QW,S$GLB,,

## 2023-09-15 PROCEDURE — 1160F PR REVIEW ALL MEDS BY PRESCRIBER/CLIN PHARMACIST DOCUMENTED: ICD-10-PCS | Mod: CPTII,S$GLB,,

## 2023-09-15 PROCEDURE — 3008F PR BODY MASS INDEX (BMI) DOCUMENTED: ICD-10-PCS | Mod: CPTII,S$GLB,,

## 2023-09-15 PROCEDURE — 87088 URINE BACTERIA CULTURE: CPT

## 2023-09-15 PROCEDURE — 3008F BODY MASS INDEX DOCD: CPT | Mod: CPTII,S$GLB,,

## 2023-09-15 PROCEDURE — 1159F PR MEDICATION LIST DOCUMENTED IN MEDICAL RECORD: ICD-10-PCS | Mod: CPTII,S$GLB,,

## 2023-09-15 PROCEDURE — 1160F RVW MEDS BY RX/DR IN RCRD: CPT | Mod: CPTII,S$GLB,,

## 2023-09-15 PROCEDURE — 87086 URINE CULTURE/COLONY COUNT: CPT

## 2023-09-15 PROCEDURE — 99999 PR PBB SHADOW E&M-EST. PATIENT-LVL III: ICD-10-PCS | Mod: PBBFAC,,,

## 2023-09-15 PROCEDURE — 1159F MED LIST DOCD IN RCRD: CPT | Mod: CPTII,S$GLB,,

## 2023-09-15 PROCEDURE — 99203 OFFICE O/P NEW LOW 30 MIN: CPT | Mod: S$GLB,,,

## 2023-09-15 PROCEDURE — 87186 SC STD MICRODIL/AGAR DIL: CPT

## 2023-09-15 PROCEDURE — 99203 PR OFFICE/OUTPT VISIT, NEW, LEVL III, 30-44 MIN: ICD-10-PCS | Mod: S$GLB,,,

## 2023-09-15 PROCEDURE — 81003 URINALYSIS AUTO W/O SCOPE: CPT | Mod: QW,S$GLB,,

## 2023-09-15 PROCEDURE — 81514 NFCT DS BV&VAGINITIS DNA ALG: CPT

## 2023-09-15 RX ORDER — AZITHROMYCIN 500 MG/1
1000 TABLET, FILM COATED ORAL ONCE
Qty: 2 TABLET | Refills: 0 | Status: SHIPPED | OUTPATIENT
Start: 2023-09-15 | End: 2023-09-15

## 2023-09-15 NOTE — PROGRESS NOTES
Subjective:       Patient ID: Chey Chavira is a 27 y.o. female.    Chief Complaint:  Urinary Tract Infection      History of Present Illness  Urinary Tract Infection   Pertinent negatives include no discharge or frequency.   Exposure to STD   The patient's pertinent negatives include no discharge, dyspareunia, dysuria, genital itching, genital lesions, genital rash or pelvic pain. This is a new problem. The current episode started yesterday. The problem has been unchanged. Pertinent negatives include no abdominal pain, anorexia, diaphoresis, fever, genital odor, rectal pain, sore throat or urinary frequency. She has tried nothing for the symptoms. Risk factors include history of STDs.     Vaginal Discharge and Irritation  Patient presents for vaginal discharge check. Sexual history reviewed with the patient. STD exposure: current sexual partner thought to have history of chlamydia. Boyfriend + for chlamydia, he has already started treatment.  Previous history of STD:  chlamydia, HSV. Current symptoms include none.  Contraception:  IUD .    GYN & OB History  No LMP recorded. Patient has had an implant.   Date of Last Pap: No result found    OB History    Para Term  AB Living   1 1 1     1   SAB IAB Ectopic Multiple Live Births           1      # Outcome Date GA Lbr Bayron/2nd Weight Sex Delivery Anes PTL Lv   1 Term     F Vag-Spont EPI  MECHELLE       Review of Systems  Review of Systems   Constitutional:  Negative for diaphoresis and fever.   HENT:  Negative for sore throat.    Gastrointestinal:  Negative for abdominal pain, anorexia and rectal pain.   Genitourinary:  Negative for dyspareunia, dysuria, frequency and pelvic pain.           Objective:      Physical Exam:   Constitutional: She is oriented to person, place, and time. She appears well-developed and well-nourished. No distress.    HENT:   Head: Normocephalic and atraumatic.    Eyes: Pupils are equal, round, and reactive to light. Conjunctivae and  EOM are normal.     Cardiovascular:  Normal rate.             Pulmonary/Chest: Effort normal.        Abdominal: Soft. She exhibits no distension. There is no abdominal tenderness. There is no rebound and no guarding. Hernia confirmed negative in the right inguinal area and confirmed negative in the left inguinal area.     Genitourinary:    Inguinal canal, uterus, right adnexa and left adnexa normal.   Rectum:      No external hemorrhoid.      Pelvic exam was performed with patient supine.   The external female genitalia was normal.   No external genitalia lesions identified,Genitalia hair distrobution normal .   Labial bartholins normal.There is no rash, tenderness, lesion or injury on the right labia. There is no rash, tenderness, lesion or injury on the left labia. Cervix is normal. Right adnexum displays no mass, no tenderness and no fullness. Left adnexum displays no mass, no tenderness and no fullness. There is vaginal discharge in the vagina. No erythema, tenderness or bleeding in the vagina.    No foreign body in the vagina.      No signs of injury in the vagina.   Cervix exhibits no motion tenderness, no lesion, no friability, no lesion, no tenderness and no polyp. Uerus contour normal  Uterus is not enlarged and not tender. Normal urethral meatus.Urethral Meatus exhibits: urethral lesion and prolapsedUrethra findings: no urethral mass, no tenderness and no urethral scarringBladder findings: no bladder distention and no bladder tendernessIUD strings visualized.          Musculoskeletal: Normal range of motion and moves all extremeties.      Lymphadenopathy: No inguinal adenopathy noted on the right or left side.    Neurological: She is alert and oriented to person, place, and time.    Skin: Skin is warm and dry. No rash noted. She is not diaphoretic. No erythema. No pallor.    Psychiatric: She has a normal mood and affect. Her behavior is normal. Judgment and thought content normal.             Assessment:         1. Exposure to chlamydia    2. Abnormal urine odor               Plan:   GC collected, will treat with Azithromycin for known exposure.   Aware to avoid intercourse for 2 weeks after her and partner both complete treatment  ALVINO in 12 weeks   Urine sent for culture     Diagnosis and orders this visit:  Exposure to chlamydia  -     azithromycin (ZITHROMAX) 500 MG tablet; Take 2 tablets (1,000 mg total) by mouth once. for 1 dose  Dispense: 2 tablet; Refill: 0  -     C. trachomatis/N. gonorrhoeae by AMP DNA  -     Vaginosis Screen by DNA Probe    Abnormal urine odor  -     POCT Urinalysis(Instrument)  -     Urine culture      Sofiya Abarca NP

## 2023-09-16 LAB
BACTERIAL VAGINOSIS DNA: NEGATIVE
CANDIDA GLABRATA DNA: NEGATIVE
CANDIDA KRUSEI DNA: NEGATIVE
CANDIDA RRNA VAG QL PROBE: POSITIVE
T VAGINALIS RRNA GENITAL QL PROBE: NEGATIVE

## 2023-09-17 LAB
C TRACH DNA SPEC QL NAA+PROBE: DETECTED
N GONORRHOEA DNA SPEC QL NAA+PROBE: NOT DETECTED

## 2023-09-18 LAB — BACTERIA UR CULT: ABNORMAL

## 2023-09-19 DIAGNOSIS — N30.00 ACUTE CYSTITIS WITHOUT HEMATURIA: Primary | ICD-10-CM

## 2023-09-19 DIAGNOSIS — B37.31 VAGINAL CANDIDIASIS: ICD-10-CM

## 2023-09-19 RX ORDER — FLUCONAZOLE 150 MG/1
150 TABLET ORAL
Qty: 2 TABLET | Refills: 0 | Status: SHIPPED | OUTPATIENT
Start: 2023-09-19 | End: 2023-09-23

## 2023-09-19 RX ORDER — NITROFURANTOIN 25; 75 MG/1; MG/1
100 CAPSULE ORAL 2 TIMES DAILY
Qty: 10 CAPSULE | Refills: 0 | Status: SHIPPED | OUTPATIENT
Start: 2023-09-19 | End: 2023-09-24

## 2023-11-07 ENCOUNTER — PATIENT MESSAGE (OUTPATIENT)
Dept: ADMINISTRATIVE | Facility: HOSPITAL | Age: 28
End: 2023-11-07
Payer: COMMERCIAL

## 2023-11-19 DIAGNOSIS — E10.65 UNCONTROLLED TYPE 1 DIABETES MELLITUS WITH HYPERGLYCEMIA: ICD-10-CM

## 2023-11-20 RX ORDER — INSULIN DEGLUDEC 200 U/ML
42 INJECTION, SOLUTION SUBCUTANEOUS DAILY
Qty: 3 PEN | Refills: 2 | Status: SHIPPED | OUTPATIENT
Start: 2023-11-20

## 2023-12-12 ENCOUNTER — TELEPHONE (OUTPATIENT)
Dept: DIABETES | Facility: CLINIC | Age: 28
End: 2023-12-12
Payer: COMMERCIAL

## 2024-01-26 ENCOUNTER — TELEPHONE (OUTPATIENT)
Dept: INTERNAL MEDICINE | Facility: CLINIC | Age: 29
End: 2024-01-26
Payer: COMMERCIAL